# Patient Record
Sex: FEMALE | Race: WHITE | NOT HISPANIC OR LATINO | Employment: OTHER | URBAN - METROPOLITAN AREA
[De-identification: names, ages, dates, MRNs, and addresses within clinical notes are randomized per-mention and may not be internally consistent; named-entity substitution may affect disease eponyms.]

---

## 2017-01-04 ENCOUNTER — OFFICE VISIT (OUTPATIENT)
Dept: INTERNAL MEDICINE | Facility: CLINIC | Age: 82
End: 2017-01-04

## 2017-01-04 VITALS
SYSTOLIC BLOOD PRESSURE: 122 MMHG | DIASTOLIC BLOOD PRESSURE: 72 MMHG | HEART RATE: 74 BPM | OXYGEN SATURATION: 98 % | WEIGHT: 144 LBS | HEIGHT: 66 IN | BODY MASS INDEX: 23.14 KG/M2

## 2017-01-04 DIAGNOSIS — R91.8 PULMONARY INFILTRATES ON CXR: ICD-10-CM

## 2017-01-04 DIAGNOSIS — K59.00 CONSTIPATION, UNSPECIFIED CONSTIPATION TYPE: Primary | ICD-10-CM

## 2017-01-04 PROBLEM — H91.93 SEVERE HEARING LOSS OF BOTH EARS: Status: ACTIVE | Noted: 2017-01-04

## 2017-01-04 PROCEDURE — 99213 OFFICE O/P EST LOW 20 MIN: CPT | Performed by: INTERNAL MEDICINE

## 2017-01-04 NOTE — PROGRESS NOTES
"Subjective     Irina Brown is a 88 y.o. female who presents with   Chief Complaint   Patient presents with   • Pneumonia     Hospital FU        History of Present Illness     Patient is seen in f/u of ER visit for constipation.  She relates having problems for several months with \"blocked bowels\".  She has hard stool that need disimpacting on occasional.  She was seen in the ER for ABD pain.  Possible infiltrate is seen on acute ABD series but she is having no symptoms.  She did not take the antibiotic.  Her bowels are fine now after disimpaction.     Review of Systems   Constitutional: Negative for fever.   Respiratory: Negative.    Cardiovascular: Negative.    Gastrointestinal: Negative for abdominal pain.       The following portions of the patient's history were reviewed and updated as appropriate: allergies, current medications and problem list.    Patient Active Problem List    Diagnosis Date Noted   • Severe hearing loss of both ears 01/04/2017   • Delusions 10/27/2016     Note Last Updated: 11/22/2016     Concerns that others are trying to take her farm.  Concerns that others are trying to kill her.  She lives with son in safe environment.  Unwilling to stop driving.  Letter sent to Medical Driving Review Board 11/2016.     • History of uterine cancer 10/26/2016   • Type 2 diabetes mellitus with neurologic complication 06/07/2016   • Hyperlipidemia 03/08/2016     Note Last Updated: 3/8/2016     Description: Patient understands that Statin drugs are absolutely recommended with her diabetes and CAD.  Patient has declined to take drugs for cholesterol.     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  S/p full w/u.  Declined geriatric or neurology referral.        • History of colonic polyps 10/10/2013       Current Outpatient Prescriptions on File Prior to Visit   Medication Sig Dispense Refill   • aspirin 81 MG tablet Take 81 mg by mouth daily.     • bacitracin 500 UNIT/GM ointment Apply  topically 2 (two) times a day. 14 g 0   • carvedilol (COREG) 25 MG tablet TAKE ONE TABLET BY MOUTH TWICE A DAY 60 tablet 4   • econazole nitrate (SPECTAZOLE) 1 % cream      • metFORMIN (GLUCOPHAGE) 500 MG tablet TAKE TWO TABLETS BY MOUTH TWICE A  tablet 0   • repaglinide (PRANDIN) 0.5 MG tablet TAKE ONE TABLET BY MOUTH BEFORE MEALS (THREE TIMES A DAY) AS DIRECTED 270 tablet 2   • SYNTHROID 88 MCG tablet TAKE ONE TABLET BY MOUTH DAILY 30 tablet 4   • vitamin B-12 (CYANOCOBALAMIN) 100 MCG tablet Take 1 tablet by mouth daily.     • vitamin B-6 (PYRIDOXINE) 100 MG tablet Take 1 tablet by mouth daily.     • ZESTORETIC 20-25 MG per tablet TAKE ONE-HALF TABLET BY MOUTH DAILY 15 tablet 4   • [DISCONTINUED] azithromycin (ZITHROMAX) 250 MG tablet Take 1 tablet by mouth Daily. 1 tablet daily for 4 days. 4 tablet 0     No current facility-administered medications on file prior to visit.        Objective     Visit Vitals   • /72   • Pulse 74   • Ht 66\" (167.6 cm)   • Wt 144 lb (65.3 kg)   • LMP  (LMP Unknown)   • SpO2 98%   • BMI 23.24 kg/m2       Physical Exam   Constitutional: She is oriented to person, place, and time. She appears well-developed and well-nourished.   HENT:   Head: Normocephalic and atraumatic.   Pulmonary/Chest: Effort normal.   Neurological: She is alert and oriented to person, place, and time.   Psychiatric: She has a normal mood and affect. Her behavior is normal.       Assessment/Plan   Irina was seen today for pneumonia.    Diagnoses and all orders for this visit:    Constipation, unspecified constipation " type    Pulmonary infiltrates on CXR        Discussion  Patient presents in f/u of constipation.  Discussed prevention of problems through water, fiber such as metamucil and stool softener.  Miralax as needed for constipation.    Infiltrate on CXR.  Plan to check CXR in f/u.         Future Appointments  Date Time Provider Department Center   4/12/2017 9:15 AM MD RIRI Conway[facility] None   9/19/2017 11:40 AM MD RIRI FungK [facility] None

## 2017-01-04 NOTE — MR AVS SNAPSHOT
Irina Brown   1/4/2017 9:15 AM   Office Visit    Dept Phone:  [phone]   Encounter #:  [id number]    Provider:  Alexandra Baptiste MD   Department:  [facility] INTERNAL MEDICINE                Your Full Care Plan              Today's Medication Changes          These changes are accurate as of: 1/4/17  9:43 AM.  If you have any questions, ask your nurse or doctor.               Stop taking medication(s)listed here:     azithromycin 250 MG tablet   Commonly known as:  ZITHROMAX                      Your Updated Medication List          This list is accurate as of: 1/4/17  9:43 AM.  Always use your most recent med list.                aspirin 81 MG tablet       bacitracin 500 UNIT/GM ointment   Apply  topically 2 (two) times a day.       carvedilol 25 MG tablet   Commonly known as:  COREG   TAKE ONE TABLET BY MOUTH TWICE A DAY       econazole nitrate 1 % cream   Commonly known as:  SPECTAZOLE       metFORMIN 500 MG tablet   Commonly known as:  GLUCOPHAGE   TAKE TWO TABLETS BY MOUTH TWICE A DAY       repaglinide 0.5 MG tablet   Commonly known as:  PRANDIN   TAKE ONE TABLET BY MOUTH BEFORE MEALS (THREE TIMES A DAY) AS DIRECTED       SYNTHROID 88 MCG tablet   Generic drug:  levothyroxine   TAKE ONE TABLET BY MOUTH DAILY       vitamin B-12 100 MCG tablet   Commonly known as:  CYANOCOBALAMIN       vitamin B-6 100 MG tablet   Commonly known as:  PYRIDOXINE       ZESTORETIC 20-25 MG per tablet   Generic drug:  lisinopril-hydrochlorothiazide   TAKE ONE-HALF TABLET BY MOUTH DAILY               Instructions     None    Patient Instructions History      Upcoming Appointments     Visit Type Date Time Department    OFFICE VISIT 1/4/2017  9:15 AM [facility]    OFFICE VISIT 4/12/2017  9:15 AM [facility]    FOLLOW UP 9/19/2017 11:40 AM [facility]     Our records indicate that your [facility] account has been deactivated. If you would  "like to reactivate your account, please email [email or web address] or call [phone] to talk to our [facility] staff.             Other Info from Your Visit           Your Appointments     Apr 12, 2017  9:15 AM EDT   Office Visit with Alexandra Baptiste MD   [facility] INTERNAL MEDICINE (--)    [address]  [facility] [address] [phone]           Arrive 15 minutes prior to appointment.            Sep 19, 2017 11:40 AM EDT   Follow Up with Shira Feliz MD   [facility] CARDIOLOGY (--)    [address]  [facility] [address] [phone]           Arrive 15 minutes prior to appointment.              Allergies     Ciprofloxacin      Diazepam      Hydrocodone      Hydrocodone-acetaminophen      Lidocaine      Penicillins      Statins        Reason for Visit     Pneumonia Hospital FU       Vital Signs     Blood Pressure Pulse Height Weight Last Menstrual Period Oxygen Saturation    122/72 74 66\" (167.6 cm) 144 lb (65.3 kg) (LMP Unknown) 98%    Body Mass Index Smoking Status                23.24 kg/m2 Never Smoker            "

## 2017-02-09 RX ORDER — LISINOPRIL AND HYDROCHLOROTHIAZIDE 20; 25 MG/1; MG/1
TABLET ORAL
Qty: 15 TABLET | Refills: 3 | Status: SHIPPED | OUTPATIENT
Start: 2017-02-09

## 2017-11-08 ENCOUNTER — APPOINTMENT (OUTPATIENT)
Dept: CT IMAGING | Facility: HOSPITAL | Age: 82
End: 2017-11-08

## 2017-11-08 ENCOUNTER — HOSPITAL ENCOUNTER (EMERGENCY)
Facility: HOSPITAL | Age: 82
Discharge: HOME | End: 2017-11-08
Attending: EMERGENCY MEDICINE | Admitting: EMERGENCY MEDICINE

## 2017-11-08 VITALS
TEMPERATURE: 97.6 F | OXYGEN SATURATION: 97 % | HEIGHT: 68 IN | RESPIRATION RATE: 18 BRPM | DIASTOLIC BLOOD PRESSURE: 89 MMHG | BODY MASS INDEX: 24.25 KG/M2 | SYSTOLIC BLOOD PRESSURE: 180 MMHG | HEART RATE: 77 BPM | WEIGHT: 160 LBS
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
## 2017-11-08 DIAGNOSIS — S00.93XA CONTUSION OF HEAD, UNSPECIFIED PART OF HEAD, INITIAL ENCOUNTER: ICD-10-CM

## 2017-11-08 PROCEDURE — 70450 CT HEAD/BRAIN W/O DYE: CPT

## 2017-11-08 PROCEDURE — 99283 EMERGENCY DEPT VISIT LOW MDM: CPT

## 2017-11-08 NOTE — ED TRIAGE NOTES
Pt brought in by [facility] ems. Reports falling this morning. Denies pain. Hx of dementia.

## 2017-11-08 NOTE — ED PROVIDER NOTES
EMERGENCY DEPARTMENT ENCOUNTER    CHIEF COMPLAINT  Chief Complaint: fall  History given by: family (son), patient  History limited by: patient's chronic confusion and chronic decreased hearing  Room Number: 28/28  PMD: Alexandra Baptiste MD      HPI:  History predominantly obtained from son who lives with pt. Pt is a 89 y.o. female who has chronic confusion and chronic decreased hearing at baseline. Son reports that as he was helping pt walk down the hallway this morning, pt lost her balance and fell. Son notes that pt did not sustain blow to head or lose consciousness during the fall. Son called EMS because he had trouble getting pt up from off the floor. In ED, pt denies headache, neck pain, having any pain at all, or sustaining any injury. Per son, pt has been at her baseline mental status since the fall. Past Medical History of diabetes and frequent falls.     Duration: fall occurred this morning  Timing: N/A  Location: N/A  Radiation: N/A  Quality: N/A  Intensity/Severity: mild   Progression: N/A  Associated Symptoms: none  Aggravating Factors: N/A  Alleviating Factors: N/A  Previous Episodes: Per son, pt has hx of frequent falls.   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   • Urge incontinence of urine 03/08/2016   • Ventricular premature beats 03/08/2016   • Peripheral vascular disease 03/08/2016   • Left bundle branch block (LBBB) 03/08/2016   • Hypothyroidism 03/08/2016   • Hypertension 03/08/2016   • Diabetic peripheral neuropathy 03/08/2016   • Chronic coronary artery disease 03/08/2016   • Auditory hallucination 03/08/2016   • Type 2 diabetes mellitus with neurologic complication 06/07/2016   • History of colonic polyps 10/10/2013   • History of uterine cancer 10/26/2016   • Delusions 10/27/2016   • Severe hearing loss of both ears 01/04/2017     Resolved Ambulatory  Problems     Diagnosis Date Noted   • Palpitations 03/08/2016   • Hallucinations 10/27/2016     Past Medical History:   Diagnosis Date   • Atherosclerotic coronary vascular disease    • Breast cancer    • Breast screening    • Chest pain    • Diabetic peripheral neuropathy    • Diverticulosis    • Endometrial cancer    • Esophageal reflux    • H/O Bell's palsy    • H/O electrocardiogram    • Health care maintenance    • Hyperlipidemia    • Hypertension    • Hypothyroidism    • Inconclusive mammogram    • LBBB (left bundle branch block)    • Numbness in both legs    • Olecranon bursitis    • Osteoarthritis, hip, bilateral    • Palpitations    • Pilonidal cyst    • Premature ventricular contractions    • PVD (peripheral vascular disease)    • Right hip pain    • Thyroid adenoma    • Type 2 diabetes mellitus    • Urge incontinence    • Uterine cancer    • UTI (urinary tract infection)    • Venous insufficiency of lower extremity    • Warthin's tumor        PAST SURGICAL HISTORY  Past Surgical History:   Procedure Laterality Date   • BACK SURGERY      cyst removal   • BREAST BIOPSY     • BREAST LUMPECTOMY Left     1956 & 1958   • CORONARY STENT PLACEMENT      previous   • HIP SURGERY      cyst removal   • HYSTERECTOMY      endometrial CA   • THYROIDECTOMY      near-total       FAMILY HISTORY  Family History   Problem Relation Age of Onset   • Breast cancer Mother    • Diabetes Mother    • Hypertension Mother    • Emphysema Father    • Hypertension Father    • Heart attack Father        SOCIAL HISTORY  Social History     Social History   • Marital status:      Spouse name: N/A   • Number of children: N/A   • Years of education: N/A     Occupational History   • Not on file.     Social History Main Topics   • Smoking status: Never Smoker   • Smokeless tobacco: Not on file   • Alcohol use No   • Drug use: No   • Sexual activity: Not on file     Other Topics Concern   • Not on file     Social History Narrative          ALLERGIES  Ciprofloxacin; Diazepam; Hydrocodone; Hydrocodone-acetaminophen; Lidocaine; Penicillins; and Statins    REVIEW OF SYSTEMS  Review of Systems   Unable to perform ROS: Other (pt's chronic confusion and chronic decreased hearing)       PHYSICAL EXAM  ED Triage Vitals   Temp Heart Rate Resp BP SpO2   11/08/17 1056 11/08/17 1056 11/08/17 1056 11/08/17 1056 11/08/17 1056   97.3 °F (36.3 °C) 69 18 189/108 96 % WNL     Physical Exam   Constitutional: She is well-developed, well-nourished, and in no distress.   Profound hard of hearing (exam limited), no obvious signs of trauma   HENT:   Head: Normocephalic and atraumatic.   Mouth/Throat: Mucous membranes are normal.   Eyes: EOM are normal. Pupils are equal, round, and reactive to light. No scleral icterus.   Neck: Normal range of motion.   No obvious c-spine tenderness   Cardiovascular: Normal rate, regular rhythm and normal heart sounds.    Pulmonary/Chest: Breath sounds normal. No respiratory distress. She exhibits no tenderness (no obvious chest tenderness).   Abdominal: Soft. There is no tenderness (no obvious abd tenderness).   Musculoskeletal: Normal range of motion.   No obvious t-spine or l-spine tenderness, FROM of all extremities   Neurological: She is alert. She has normal motor skills and normal sensation.   Appears confused, nonfocal neuro exam   Skin: Skin is warm and dry.   Nursing note and vitals reviewed.      RADIOLOGY         CT Head Without Contrast (Preliminary result) Result time: 11/08/17 13:06:59     Preliminary result by Interface, Oceen Crow In (11/08/17 13:06:59)     Impression:     No evidence of fracture or of intracranial hemorrhage. There  is age-appropriate atrophy, mild small vessel ischemic disease and  moderate vascular calcification.     Radiation dose reduction techniques were utilized, including automated  exposure control and exposure modulation based on body size.           Narrative:     CT HEAD WITHOUT  CONTRAST     HISTORY: Fall.     A noncontrasted CT examination of the brain was performed.     The brain and ventricles are symmetrical. There is no evidence of  hemorrhage, hydrocephalus or of abnormal extra-axial fluid. No focal  area of decreased attenuation to suggest acute infarction is identified.  Bone windows showed no evidence of a calvarial fracture.              I ordered the above noted radiological studies and reviewed the images on the PACS system.      PROGRESS AND CONSULTS  11:28 AM- Reviewed pt's history and workup with Dr. Harp.  At bedside evaluation, they agree with the plan of care.  2:03 PM- Rechecked pt. She is resting comfortably and is in no acute distress. Reviewed implications of results (including CT head findings (no acute process)), diagnosis, meds, responsibility to follow up, warning signs and symptoms of possible worsening, potential complications and reasons to return to ER with patient's family. Discussed all results and noted any abnormalities with patient's family.  Discussed with family about absolute need to have pt recheck abnormalities and condition with PMD.   Discussed plan for discharge, as there is no emergent indication for admission.  Pt's family is agreeable and understand need for follow up and repeat testing. Family is aware that discharge does not mean that nothing is wrong but it indicates no emergency is present.  Pt is discharged with instructions to follow up with primary care doctor to have their blood pressure rechecked.       DIAGNOSIS  Final diagnoses:   Fall, initial encounter   Contusion of head, unspecified part of head, initial encounter       FOLLOW UP   Alexandra Baptiste MD  [address] [id number] [phone]    Call in 1 day          COURSE & MEDICAL DECISION MAKING  Pertinent Imaging studies that were ordered and reviewed are noted above.  Results were reviewed/discussed with the patient's family and they were also made  "aware of online assess.   Pt's family also made aware that some labs, such as cultures, will not be resulted during ER visit and follow up with PMD is necessary.     MEDICATIONS GIVEN IN ER  Medications - No data to display    /89  Pulse 77  Temp 97.6 °F (36.4 °C)  Resp 18  Ht 68\" (172.7 cm)  Wt 160 lb (72.6 kg)  LMP  (LMP Unknown)  SpO2 97%  BMI 24.33 kg/m2      I personally reviewed the past medical history, past surgical history, social history, family history, current medications and allergies as they appear in this chart.  The scribe's note accurately reflects the work and decisions made by me.     Documentation assistance provided by jorge Heard for DENNIS Olsen on 11/8/2017 at 2:07 PM. Information recorded by the scribe was done at my direction and has been verified and validated by me.       Martinez Heard  11/08/17 1418       ALLA Morocho  11/08/17 1500    "

## 2017-11-08 NOTE — DISCHARGE INSTRUCTIONS
Continue current home medications  Use walker when ambulating  Follow up with pmd as needed  Return to er for fever,chills, chest pain, shortness of air, dizziness, vomiting, headache, weakness or any new or worsening symptoms

## 2017-11-08 NOTE — ED PROVIDER NOTES
Pt is a 89 y.o. female who presents s/p fall this morning. Pt's son denies head injury or LOC from the fall. Pt's son called EMS because he was not able to get the Pt off the floor. Hx limited by Pt dementia and hearing loss. Pt's son states Pt is at baseline mental status currently.     PE:  Resting comfortably, no distress  Vitals stable  Head atraumatic and neck non tender  Extremities and back non tender    Plan to check CT head.     I supervised care provided by the midlevel provider Keren GOLD.    We have discussed this patient's history, physical exam, and treatment plan.   I have reviewed the note and personally saw and examined the patient and agree with the plan of care.    Documentation assistance provided by jorge Euceda for Dr. Harp.  Information recorded by the scribe was done at my direction and has been verified and validated by me.       Karol Euceda  11/08/17 1248       Harsh Harp MD  11/08/17 [id number]

## 2017-11-10 ENCOUNTER — PATIENT OUTREACH (OUTPATIENT)
Dept: CASE MANAGEMENT | Facility: OTHER | Age: 82
End: 2017-11-10

## 2017-11-10 NOTE — OUTREACH NOTE
"Spoke with son and patient has not had any physician follow-up for at \"least a year.\"  Reports advanced dementia and has stopped taking medications.  Advised to schedule routine physician appointments and reports \"she will refuse.\"  Discussed physician practices that make house calls and declined contact resources today.  Son provides carry out food and shopping for meals.  Patient no longer takes prescribed medications and would refuse vaccines.  Discussed at length behavioral issues related to dementia including her hallucinations and best practices to manage symptoms.  States independent with personal care needs and denies issues of wandering.  Discussed safety issues and preventive measures related to dementia and falls.  Care management assessment completed today.  "

## 2017-12-21 ENCOUNTER — APPOINTMENT (OUTPATIENT)
Dept: GENERAL RADIOLOGY | Facility: HOSPITAL | Age: 82
End: 2017-12-21

## 2017-12-21 ENCOUNTER — HOSPITAL ENCOUNTER (EMERGENCY)
Facility: HOSPITAL | Age: 82
Discharge: HOME | End: 2017-12-21
Admitting: EMERGENCY MEDICINE

## 2017-12-21 VITALS
RESPIRATION RATE: 18 BRPM | TEMPERATURE: 98.2 F | HEART RATE: 59 BPM | BODY MASS INDEX: 27.32 KG/M2 | SYSTOLIC BLOOD PRESSURE: 192 MMHG | HEIGHT: 66 IN | OXYGEN SATURATION: 98 % | WEIGHT: 170 LBS | DIASTOLIC BLOOD PRESSURE: 83 MMHG

## 2017-12-21 DIAGNOSIS — M54.6 ACUTE BILATERAL THORACIC BACK PAIN: ICD-10-CM

## 2017-12-21 DIAGNOSIS — S39.012A LUMBAR STRAIN, INITIAL ENCOUNTER: ICD-10-CM
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
## 2017-12-21 PROCEDURE — 99284 EMERGENCY DEPT VISIT MOD MDM: CPT

## 2017-12-21 PROCEDURE — 72072 X-RAY EXAM THORAC SPINE 3VWS: CPT

## 2017-12-21 PROCEDURE — 72110 X-RAY EXAM L-2 SPINE 4/>VWS: CPT

## 2017-12-21 PROCEDURE — 71101 X-RAY EXAM UNILAT RIBS/CHEST: CPT

## 2017-12-21 NOTE — DISCHARGE INSTRUCTIONS
You can take Tylenol as needed for pain    Return Precautions    Although you are being discharged from the ED today, I encourage you to return for worsening symptoms.  Things can, and do, change such that treatment at home with medication may not be adequate.      Specifically, return for any of the following:    Chest pain, shortness of breath, pain or nausea and vomiting not controlled by medications provided.    Please make a follow up with your Primary Care Provider for a blood pressure recheck.

## 2017-12-21 NOTE — ED PROVIDER NOTES
EMERGENCY DEPARTMENT ENCOUNTER    Room Number:  14/14  Date seen:  12/21/2017  Time seen: 2:02 PM  PCP: Alexandra Baptiste MD   History provided by- patient, past medical records  History limited by- dementia, profound hearing loss    HPI:  Chief complaint: pain post fall  Context:Irina Brown is a 89 y.o. female who presents to the ED s/p fall that occurred today. She states that while she was in the [email or web address] parking lot, she lost her balance and fell, landing on her right side. Since then, she has had right mid back pain/right rib pain. She denies blow to head, loss of consciousness, headache, neck pain, abd pain, and trouble breathing. Pt unable to provide any further history secondary to dementia and profound hearing loss. There are no other complaints at this time.     Timing: unknown  Duration: fall occurred today  Location: right mid back/right ribs  Radiation: unknown  Quality: pain  Intensity/Severity: unknown  Associated Symptoms: right mid back pain/right rib pain  Aggravating Factors: unknown  Alleviating Factors: unknown  Previous Episodes: Per past medical records, pt has hx of frequent falls.   Treatment before arrival: unknown    MEDICAL RECORD REVIEW  Pt was seen in ED on 11/8/17 s/p fall. At the time, CT head showed no acute process. Pt has hx of frequent falls.       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   • Urge incontinence of urine 03/08/2016   • Ventricular premature beats 03/08/2016   • Peripheral vascular disease 03/08/2016   • Left bundle branch block (LBBB) 03/08/2016   • Hypothyroidism 03/08/2016   • Hypertension 03/08/2016   • Diabetic peripheral neuropathy 03/08/2016   • Chronic coronary artery disease 03/08/2016   • Auditory hallucination 03/08/2016   • Type 2 diabetes mellitus  with neurologic complication 06/07/2016   • History of colonic polyps 10/10/2013   • History of uterine cancer 10/26/2016   • Delusions 10/27/2016   • Severe hearing loss of both ears 01/04/2017     Resolved Ambulatory Problems     Diagnosis Date Noted   • Palpitations 03/08/2016   • Hallucinations 10/27/2016     Past Medical History:   Diagnosis Date   • Atherosclerotic coronary vascular disease    • Breast cancer    • Breast screening    • Chest pain    • Dementia    • Diabetic peripheral neuropathy    • Diverticulosis    • Endometrial cancer    • Esophageal reflux    • H/O Bell's palsy    • H/O electrocardiogram    • Health care maintenance    • Hyperlipidemia    • Hypertension    • Hypothyroidism    • Inconclusive mammogram    • LBBB (left bundle branch block)    • Numbness in both legs    • Olecranon bursitis    • Osteoarthritis, hip, bilateral    • Palpitations    • Pilonidal cyst    • Premature ventricular contractions    • PVD (peripheral vascular disease)    • Right hip pain    • Thyroid adenoma    • Type 2 diabetes mellitus    • Urge incontinence    • Uterine cancer    • UTI (urinary tract infection)    • Venous insufficiency of lower extremity    • Warthin's tumor        PAST SURGICAL HISTORY  Past Surgical History:   Procedure Laterality Date   • BACK SURGERY      cyst removal   • BREAST BIOPSY     • BREAST LUMPECTOMY Left     1956 & 1958   • CORONARY STENT PLACEMENT      previous   • HIP SURGERY      cyst removal   • HYSTERECTOMY      endometrial CA   • THYROIDECTOMY      near-total       FAMILY HISTORY  Family History   Problem Relation Age of Onset   • Breast cancer Mother    • Diabetes Mother    • Hypertension Mother    • Emphysema Father    • Hypertension Father    • Heart attack Father        SOCIAL HISTORY  Social History     Social History   • Marital status:      Spouse name: N/A   • Number of children: N/A   • Years of education: N/A     Occupational History   • Not on file.     Social  History Main Topics   • Smoking status: Never Smoker   • Smokeless tobacco: Not on file   • Alcohol use No   • Drug use: No   • Sexual activity: Not on file     Other Topics Concern   • Not on file     Social History Narrative   • No narrative on file       REVIEW OF SYSTEMS  Review of Systems   Unable to perform ROS: Dementia (profound hearing loss)   Respiratory: Negative for shortness of breath.    Cardiovascular: Positive for chest pain (right mid back pain/right rib pain  ).   Gastrointestinal: Negative for abdominal pain.   Musculoskeletal: Positive for back pain (right mid back pain/right rib pain  ). Negative for neck pain.   Neurological: Negative for headaches.       PHYSICAL EXAM  ED Triage Vitals   Temp Heart Rate Resp BP SpO2   12/21/17 1326 12/21/17 1322 12/21/17 1322 12/21/17 1322 12/21/17 1322   98.2 °F (36.8 °C) 88 18 180/90 99 % WNL      Temp src Heart Rate Source Patient Position BP Location FiO2 (%)   12/21/17 1326 12/21/17 1322 -- -- --   Tympanic Monitor        Physical Exam   Constitutional: No distress.   Exam limited by profound hearing loss   HENT:   Head: Normocephalic and atraumatic.   Profoundly hard of hearing   Eyes: EOM are normal. Pupils are equal, round, and reactive to light.   Neck: Normal range of motion. Neck supple.   No c-spine tenderness   Cardiovascular: Normal rate, regular rhythm and normal heart sounds.    Pulmonary/Chest: Effort normal and breath sounds normal. No respiratory distress. She exhibits no tenderness.   Musculoskeletal:   Mild t-spine tenderness, no l-spine tenderness, no palpable rib tenderness on the right   Neurological: She is alert.   Motor function intact to all extremities, pleasantly confused but answers questions appropriately   Skin: Skin is warm and dry.   Nursing note and vitals reviewed.        RADIOLOGY         XR Ribs Right With PA Chest (Preliminary result) Result time: 12/21/17 16:31:49     Preliminary result by Interface, Rad Results  Edgardo In (12/21/17 16:31:49)     Impression:     No acute abnormality is identified on x-rays of the chest,  right thoracic cage, thoracic spine, or lumbar spine.        Narrative:     THORACIC SPINE AND LUMBAR SPINE X-RAYS, PA CHEST X-RAY AND RIGHT RIB  SERIES     HISTORY: Fall, pain in all of the above locations.     A PA view of the chest and 4 views of the right thoracic cage is  provided. The cardiomediastinal silhouette is normal and the lungs are  clear. There is no pneumothorax. No rib fracture or rib lesion is  identified.     Three images of the thoracic spine and 4 images of the lumbar spine are  provided. There is degenerative disc change throughout the thoracic  spine. Vertebral body height appears intact.     In the lumbar spine, there is advanced multilevel degenerative disc  change with vacuum phenomenon at every level except the L1-2 level.  Vertebral body height and alignment are normal. No fracture is  identified.          I ordered the above noted radiological studies and reviewed the images on the PACS system.          MEDICATIONS GIVEN IN ER  Medications - No data to display        PROCEDURES  Procedures    COURSE & MEDICAL DECISION MAKING  Pertinent Imaging studies that were ordered and reviewed are noted above.  Results were reviewed/discussed with the patient and they were also made aware of online assess.  Pt also made aware that some labs, such as cultures, will not be resulted during ER visit and follow up with PMD is necessary.       PROGRESS AND CONSULTS    Progress Notes:    ED Course       1447- Pt ambulated to and from restroom with steady gait and with staff assistance x2.     1603- Reviewed pt's history and workup with Dr. Zhang.  After a bedside evaluation; Dr Zhang agrees with the plan of care.     1652- Rechecked pt. She is resting comfortably and is in no acute distress. Discussed with pt about all pertinent results including right ribs and chest xray, t-spine xray, and  "l-spine xray findings that show no acute process. Informed pt of diagnosis and plan for discharge. Advised pt to take tylenol for pain. Instructed to f/u with PMD for recheck. RTER warnings given. Addressed all questions.    The patient's history, physical exam, and lab findings were discussed with the physician, who also performed a face to face history and physical exam.  I discussed all results and noted any abnormalities with patient.  Discussed absoute need to recheck abnormalities with their family physician.  I answered any of the patient's questions.  Discussed plan for discharge, as there is no emergent indication for admission.  Pt is agreeable and understands need for follow up and repeat testing.  Pt is aware that discharge does not mean that nothing is wrong but it indicates no emergency is present and they must continue care with their family physician.  Pt is discharged with instructions to follow up with primary care doctor to have their blood pressure rechecked.     Recheck may have been hampered due to pt's profound hearing loss.      Disposition vitals:  BP (!) 189/85  Pulse 58  Temp 98.2 °F (36.8 °C) (Tympanic)   Resp 18  Ht 167.6 cm (66\")  Wt 77.1 kg (170 lb)  LMP  (LMP Unknown)  SpO2 96%  BMI 27.44 kg/m2      DIAGNOSIS  Final diagnoses:   Fall (on)(from) sidewalk curb, initial encounter   Lumbar strain, initial encounter   Acute bilateral thoracic back pain       FOLLOW UP   Alexandra Baptiste MD  [address] [address]  [phone]    Schedule an appointment as soon as possible for a visit  As needed      Documentation assistance provided by jorge Heard for [facility].  Information recorded by the scribcindy was done at my direction and has been verified and validated by me.  Electronically signed by Martinez Heard on 12/21/2017 at time 4:55 PM       Martinez Heard  12/21/17 1711       ALLA Bui  12/22/17 0015    "

## 2017-12-21 NOTE — ED PROVIDER NOTES
"Pt presents to the ED department due to mid back and rib pain sustain when she was stuck with a \"machine\"/ scooter in the [employer] parking lot today at about noon.  Pt denies vomiting, LOC or abdominal pain.  On exam there is no C-spine tenderness.  Heart RRR,  Lungs are CTAB.  There is mild right posterior rib pain, mid and lower thoracic spinous tenderness.  Abdomen has normal active bowel sounds, soft, non-tender, non distended.  1+ edema with both legs, but FROM bilaterally.      I supervised care provided by the midlevel provider.    We have discussed this patient's history, physical exam, and treatment plan.   I have reviewed the note and personally saw and examined the patient and agree with the plan of care.    Documentation assistance provided by jorge Shetty for Dr. Zhang.  Information recorded by the scribe was done at my direction and has been verified and validated by me.       Christa Shetty  12/21/17 1648       Agus Zhang MD  12/22/17 0003    "

## 2017-12-21 NOTE — ED TRIAGE NOTES
Pt was walking out of [employer], lost her balance and fell backwards, landing on her right side, c/o right rib pain. Denies hitting head. Pt's son states pt no longer takes her medications since losing her 's license, however, pt states she still takes her heart meds

## 2018-01-04 ENCOUNTER — PATIENT OUTREACH (OUTPATIENT)
Dept: CASE MANAGEMENT | Facility: OTHER | Age: 83
End: 2018-01-04

## 2018-01-05 ENCOUNTER — PATIENT OUTREACH (OUTPATIENT)
Dept: CASE MANAGEMENT | Facility: OTHER | Age: 83
End: 2018-01-05

## 2018-01-05 NOTE — OUTREACH NOTE
Contacted by son to discuss options for nursing home placement, advance directives, and behavioral annie evaluation.  Discussed facilities in his area and the importance of placement in a facility with a Memory Care Unit and he plans to follow-up with contact to three facilities in the [place], [place] area. Discussed disease progression with dementia and need for medical attention with behavioral disturbance and safety precautions related to recent falls.  He is aware patient should not be left alone.

## 2018-01-08 ENCOUNTER — PATIENT OUTREACH (OUTPATIENT)
Dept: CASE MANAGEMENT | Facility: OTHER | Age: 83
End: 2018-01-08

## 2018-01-08 NOTE — OUTREACH NOTE
Review of Urgent Care visit from 1/5/18.  States patient has never been to an [facility] but has a representative coming today to discuss placement options.  Continues with hallucinations, son providing 24 hour care with ADL assistance, and acknowledges behavioral disturbance related to dementia.  Contact information provided to contact [place] as needed to assist with resources.

## 2018-01-18 ENCOUNTER — EPISODE CHANGES (OUTPATIENT)
Dept: CASE MANAGEMENT | Facility: OTHER | Age: 83
End: 2018-01-18

## 2018-01-24 ENCOUNTER — PATIENT OUTREACH (OUTPATIENT)
Dept: CASE MANAGEMENT | Facility: OTHER | Age: 83
End: 2018-01-24

## 2018-02-26 ENCOUNTER — PATIENT OUTREACH (OUTPATIENT)
Dept: CASE MANAGEMENT | Facility: OTHER | Age: 83
End: 2018-02-26

## 2018-05-09 ENCOUNTER — HOSPITAL ENCOUNTER (EMERGENCY)
Facility: HOSPITAL | Age: 83
Discharge: HOME | End: 2018-05-09
Attending: EMERGENCY MEDICINE | Admitting: EMERGENCY MEDICINE

## 2018-05-09 VITALS
WEIGHT: 175 LBS | HEIGHT: 66 IN | HEART RATE: 88 BPM | SYSTOLIC BLOOD PRESSURE: 150 MMHG | RESPIRATION RATE: 20 BRPM | BODY MASS INDEX: 28.12 KG/M2 | TEMPERATURE: 98.5 F | DIASTOLIC BLOOD PRESSURE: 88 MMHG | OXYGEN SATURATION: 98 %

## 2018-05-09 DIAGNOSIS — K02.9 DENTAL CARIES: ICD-10-CM

## 2018-05-09 DIAGNOSIS — K04.7 DENTAL ABSCESS: Primary | ICD-10-CM

## 2018-05-09 PROCEDURE — 99283 EMERGENCY DEPT VISIT LOW MDM: CPT

## 2018-05-09 RX ORDER — CLINDAMYCIN HYDROCHLORIDE 300 MG/1
300 CAPSULE ORAL 4 TIMES DAILY
Qty: 28 CAPSULE | Refills: 0 | Status: SHIPPED | OUTPATIENT
Start: 2018-05-09

## 2018-05-09 NOTE — ED TRIAGE NOTES
Pt has area on Left lower jaw that is swollen, tender.  States showed up last night. Pt is very hard of hearing, no family in triage with patient.

## 2018-05-09 NOTE — ED PROVIDER NOTES
[facility] EMERGENCY DEPARTMENT ENCOUNTER    CHIEF COMPLAINT  Chief Complaint: facial swelling  History given by: patient  History limited by: nothing  [facility] Room Number: 47/47  PMD: Alexandra Baptiste MD      HPI:  Pt is a 89 y.o. female who presents complaining of left sided facial swelling that she first noticed last night. Pt states that the swelling has improved since that time. Pt denies additional complaint.    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   • Urge incontinence of urine 03/08/2016   • Ventricular premature beats 03/08/2016   • Peripheral vascular disease 03/08/2016   • Left bundle branch block (LBBB) 03/08/2016   • Hypothyroidism 03/08/2016   • Hypertension 03/08/2016   • Diabetic peripheral neuropathy 03/08/2016   • Chronic coronary artery disease 03/08/2016   • Auditory hallucination 03/08/2016   • Type 2 diabetes mellitus with neurologic complication 06/07/2016   • History of colonic polyps 10/10/2013   • History of uterine cancer 10/26/2016   • Delusions 10/27/2016   • Severe hearing loss of both ears 01/04/2017     Resolved Ambulatory Problems     Diagnosis Date Noted   • Palpitations 03/08/2016   • Hallucinations 10/27/2016     Past Medical History:   Diagnosis Date   • Atherosclerotic coronary vascular disease    • Breast cancer    • Breast screening    • Chest pain    • Dementia    • Diabetic peripheral neuropathy    • Diverticulosis    • Endometrial cancer    • Esophageal reflux    • H/O Bell's palsy    • H/O electrocardiogram    • Health care maintenance    • Hyperlipidemia    • Hypertension    • Hypothyroidism    • Inconclusive mammogram    • LBBB (left bundle branch block)    • Numbness in both legs    • Olecranon bursitis    • Osteoarthritis, hip, bilateral    •  Palpitations    • Pilonidal cyst    • Premature ventricular contractions    • PVD (peripheral vascular disease)    • Right hip pain    • Thyroid adenoma    • Type 2 diabetes mellitus    • Urge incontinence    • Uterine cancer    • UTI (urinary tract infection)    • Venous insufficiency of lower extremity    • Warthin's tumor        PAST SURGICAL HISTORY  Past Surgical History:   Procedure Laterality Date   • BACK SURGERY      cyst removal   • BREAST BIOPSY     • BREAST LUMPECTOMY Left     1956 & 1958   • CORONARY STENT PLACEMENT      previous   • HIP SURGERY      cyst removal   • HYSTERECTOMY      endometrial CA   • THYROIDECTOMY      near-total       FAMILY HISTORY  Family History   Problem Relation Age of Onset   • Breast cancer Mother    • Diabetes Mother    • Hypertension Mother    • Emphysema Father    • Hypertension Father    • Heart attack Father        SOCIAL HISTORY  Social History     Social History   • Marital status:      Spouse name: N/A   • Number of children: N/A   • Years of education: N/A     Occupational History   • Not on file.     Social History Main Topics   • Smoking status: Never Smoker   • Smokeless tobacco: Not on file   • Alcohol use No   • Drug use: No   • Sexual activity: Not on file     Other Topics Concern   • Not on file     Social History Narrative   • No narrative on file       ALLERGIES  Ciprofloxacin; Diazepam; Hydrocodone; Hydrocodone-acetaminophen; Lidocaine; Penicillins; and Statins    REVIEW OF SYSTEMS  Review of Systems   Constitutional: Negative for fever.   HENT: Positive for facial swelling (left sided). Negative for sore throat.    Eyes: Negative.    Respiratory: Negative for cough and shortness of breath.    Cardiovascular: Negative for chest pain.   Gastrointestinal: Negative for abdominal pain, diarrhea and vomiting.   Genitourinary: Negative for dysuria.   Musculoskeletal: Negative for neck pain.   Skin: Negative for rash.   Allergic/Immunologic: Negative.     Neurological: Negative for weakness, numbness and headaches.   Hematological: Negative.    Psychiatric/Behavioral: Negative.    All other systems reviewed and are negative.      PHYSICAL EXAM  ED Triage Vitals   Temp Heart Rate Resp BP SpO2   05/09/18 1425 05/09/18 1314 05/09/18 1314 05/09/18 1314 05/09/18 1314   98.5 °F (36.9 °C) 88 20 150/88 98 %      Temp src Heart Rate Source Patient Position BP Location FiO2 (%)   05/09/18 1425 -- -- -- --   Oral           Physical Exam   Constitutional: No distress.   Pt appears elderly and smells of urine   HENT:   Head: Normocephalic and atraumatic.   Right Ear: Decreased hearing (chronic) is noted.   Left Ear: Decreased hearing (chronic) is noted.   Mouth/Throat: Dental abscesses and dental caries present.   2nd premolar and two molars on left lower jaw have extensive dental caries with gingival erythema and abscess   Eyes: EOM are normal. Pupils are equal, round, and reactive to light.   Neck: Normal range of motion. Neck supple.   Cardiovascular: Normal rate, regular rhythm and normal heart sounds.    No murmur heard.  Pulmonary/Chest: Effort normal and breath sounds normal. No respiratory distress.   Abdominal: Soft. There is no tenderness. There is no rebound and no guarding.   Musculoskeletal: Normal range of motion. She exhibits edema (mild, bilateral ankles).   Pt has moderate to severe onychomycosis on bilateral feet   Neurological: She is alert.   Skin: Skin is warm and dry. No rash noted.   Psychiatric: Mood and affect normal.   Nursing note and vitals reviewed.    PROCEDURES  Procedures      PROGRESS AND CONSULTS  ED Course     1507- Notified pt that she has a dental abscess and will need to follow up with a dentist to have her teeth pulled. I notified pt that she will need to follow up with a podiatrist for her feet. Discussed the plan to discharge the pt home with a prescription for abx. Pt understands and agrees with the plan, all questions  answered.    MEDICAL DECISION MAKING  Results were reviewed/discussed with the patient and they were also made aware of online access. Pt also made aware that follow up with PMD is necessary.     MDM  Number of Diagnoses or Management Options  Dental abscess:   Dental caries:      Amount and/or Complexity of Data Reviewed  Decide to obtain previous medical records or to obtain history from someone other than the patient: yes  Review and summarize past medical records: yes (Pt was last seen in the ED in December 2017 for lumbar strain s/p fall. Pt was seen at  on 1/5/18 for AMS.)    Patient Progress  Patient progress: stable         DIAGNOSIS  Final diagnoses:   Dental abscess   Dental caries       DISPOSITION  DISCHARGE    Patient discharged in stable condition.    Reviewed implications of results, diagnosis, meds, responsibility to follow up, warning signs and symptoms of possible worsening, potential complications and reasons to return to ER, including fever, worsening pain or any concerns.    Patient/Family voiced understanding of above instructions.    Discussed plan for discharge, as there is no emergent indication for admission. Patient referred to primary care provider for BP management due to today's BP. Pt/family is agreeable and understands need for follow up and repeat testing.  Pt is aware that discharge does not mean that nothing is wrong but it indicates no emergency is present that requires admission and they must continue care with follow-up as given below or physician of their choice.     FOLLOW-UP  Sawyer Mcleod DPM  [address]  [facility] [address] [phone]    Schedule an appointment as soon as possible for a visit       Hiren Keene DDS  [address] [address] [phone]    Schedule an appointment as soon as possible for a visit            Medication List      New Prescriptions    clindamycin 300 MG capsule  Commonly known as:   CLEOCIN  Take 1 capsule by mouth 4 (Four) Times a Day.              Latest Documented Vital Signs:  As of 3:38 PM  BP- 150/88 HR- 88 Temp- 98.5 °F (36.9 °C) (Oral) O2 sat- 98%    --  Documentation assistance provided by jorge Trent for Dr. Ca.  Information recorded by the scribe was done at my direction and has been verified and validated by me.     Tammi Trent  05/09/18 1539       Luis Ca MD  05/09/18 4374

## 2018-07-05 ENCOUNTER — HOSPITAL ENCOUNTER (EMERGENCY)
Facility: HOSPITAL | Age: 83
Discharge: HOME | End: 2018-07-05
Attending: EMERGENCY MEDICINE | Admitting: EMERGENCY MEDICINE

## 2018-07-05 ENCOUNTER — APPOINTMENT (OUTPATIENT)
Dept: GENERAL RADIOLOGY | Facility: HOSPITAL | Age: 83
End: 2018-07-05

## 2018-07-05 VITALS
BODY MASS INDEX: 24.81 KG/M2 | TEMPERATURE: 99.1 F | HEART RATE: 80 BPM | SYSTOLIC BLOOD PRESSURE: 197 MMHG | OXYGEN SATURATION: 94 % | RESPIRATION RATE: 18 BRPM | WEIGHT: 167.5 LBS | DIASTOLIC BLOOD PRESSURE: 85 MMHG | HEIGHT: 69 IN

## 2018-07-05 DIAGNOSIS — R19.7 DIARRHEA OF PRESUMED INFECTIOUS ORIGIN: ICD-10-CM

## 2018-07-05 DIAGNOSIS — S20.211A CONTUSION OF RIGHT CHEST WALL, INITIAL ENCOUNTER: ICD-10-CM
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
## 2018-07-05 LAB
ALBUMIN SERPL-MCNC: 4.1 G/DL (ref 3.5–5.2)
ALBUMIN/GLOB SERPL: 1.2 G/DL
ALP SERPL-CCNC: 108 U/L (ref 39–117)
ALT SERPL W P-5'-P-CCNC: 9 U/L (ref 1–33)
ANION GAP SERPL CALCULATED.3IONS-SCNC: 10.5 MMOL/L
AST SERPL-CCNC: 11 U/L (ref 1–32)
BASOPHILS # BLD AUTO: 0.03 10*3/MM3 (ref 0–0.2)
BASOPHILS NFR BLD AUTO: 0.5 % (ref 0–1.5)
BILIRUB SERPL-MCNC: 0.4 MG/DL (ref 0.1–1.2)
BILIRUB UR QL STRIP: NEGATIVE
BUN BLD-MCNC: 23 MG/DL (ref 8–23)
BUN/CREAT SERPL: 22.1 (ref 7–25)
CALCIUM SPEC-SCNC: 9.1 MG/DL (ref 8.6–10.5)
CHLORIDE SERPL-SCNC: 98 MMOL/L (ref 98–107)
CLARITY UR: CLEAR
CO2 SERPL-SCNC: 27.5 MMOL/L (ref 22–29)
COLOR UR: YELLOW
CREAT BLD-MCNC: 1.04 MG/DL (ref 0.57–1)
DEPRECATED RDW RBC AUTO: 45 FL (ref 37–54)
EOSINOPHIL # BLD AUTO: 0.02 10*3/MM3 (ref 0–0.7)
EOSINOPHIL NFR BLD AUTO: 0.3 % (ref 0.3–6.2)
ERYTHROCYTE [DISTWIDTH] IN BLOOD BY AUTOMATED COUNT: 13.2 % (ref 11.7–13)
GFR SERPL CREATININE-BSD FRML MDRD: 50 ML/MIN/1.73
GLOBULIN UR ELPH-MCNC: 3.3 GM/DL
GLUCOSE BLD-MCNC: 188 MG/DL (ref 65–99)
GLUCOSE UR STRIP-MCNC: ABNORMAL MG/DL
HCT VFR BLD AUTO: 39.2 % (ref 35.6–45.5)
HGB BLD-MCNC: 13.1 G/DL (ref 11.9–15.5)
HGB UR QL STRIP.AUTO: NEGATIVE
HOLD SPECIMEN: NORMAL
HOLD SPECIMEN: NORMAL
IMM GRANULOCYTES # BLD: 0.01 10*3/MM3 (ref 0–0.03)
IMM GRANULOCYTES NFR BLD: 0.2 % (ref 0–0.5)
KETONES UR QL STRIP: NEGATIVE
LEUKOCYTE ESTERASE UR QL STRIP.AUTO: NEGATIVE
LYMPHOCYTES # BLD AUTO: 1.55 10*3/MM3 (ref 0.9–4.8)
LYMPHOCYTES NFR BLD AUTO: 26.9 % (ref 19.6–45.3)
MCH RBC QN AUTO: 31.3 PG (ref 26.9–32)
MCHC RBC AUTO-ENTMCNC: 33.4 G/DL (ref 32.4–36.3)
MCV RBC AUTO: 93.6 FL (ref 80.5–98.2)
MONOCYTES # BLD AUTO: 0.34 10*3/MM3 (ref 0.2–1.2)
MONOCYTES NFR BLD AUTO: 5.9 % (ref 5–12)
NEUTROPHILS # BLD AUTO: 3.82 10*3/MM3 (ref 1.9–8.1)
NEUTROPHILS NFR BLD AUTO: 66.4 % (ref 42.7–76)
NITRITE UR QL STRIP: NEGATIVE
PH UR STRIP.AUTO: 7.5 [PH] (ref 5–8)
PLATELET # BLD AUTO: 188 10*3/MM3 (ref 140–500)
PMV BLD AUTO: 10.5 FL (ref 6–12)
POTASSIUM BLD-SCNC: 4.2 MMOL/L (ref 3.5–5.2)
PROT SERPL-MCNC: 7.4 G/DL (ref 6–8.5)
PROT UR QL STRIP: NEGATIVE
RBC # BLD AUTO: 4.19 10*6/MM3 (ref 3.9–5.2)
SODIUM BLD-SCNC: 136 MMOL/L (ref 136–145)
SP GR UR STRIP: 1.01 (ref 1–1.03)
UROBILINOGEN UR QL STRIP: ABNORMAL
WBC NRBC COR # BLD: 5.76 10*3/MM3 (ref 4.5–10.7)
WHOLE BLOOD HOLD SPECIMEN: NORMAL
WHOLE BLOOD HOLD SPECIMEN: NORMAL

## 2018-07-05 PROCEDURE — 99284 EMERGENCY DEPT VISIT MOD MDM: CPT

## 2018-07-05 PROCEDURE — 81003 URINALYSIS AUTO W/O SCOPE: CPT | Performed by: EMERGENCY MEDICINE

## 2018-07-05 PROCEDURE — 80053 COMPREHEN METABOLIC PANEL: CPT | Performed by: EMERGENCY MEDICINE

## 2018-07-05 PROCEDURE — 71101 X-RAY EXAM UNILAT RIBS/CHEST: CPT

## 2018-07-05 PROCEDURE — 85025 COMPLETE CBC W/AUTO DIFF WBC: CPT | Performed by: EMERGENCY MEDICINE

## 2018-07-05 RX ORDER — SODIUM CHLORIDE 0.9 % (FLUSH) 0.9 %
10 SYRINGE (ML) INJECTION AS NEEDED
Status: DISCONTINUED | OUTPATIENT
Start: 2018-07-05 | End: 2018-07-05 | Stop reason: HOSPADM

## 2018-07-05 RX ORDER — METRONIDAZOLE 250 MG/1
250 TABLET ORAL 3 TIMES DAILY
Qty: 21 TABLET | Refills: 0 | Status: SHIPPED | OUTPATIENT
Start: 2018-07-05

## 2018-07-05 NOTE — ED NOTES
Message left for son by Anisha RAZA stating pt is being discharged, son asked EMS to have RN call when pt was ready to be discharged on arrival.      Wanda Rae RN  07/05/18 0982

## 2018-07-05 NOTE — PROGRESS NOTES
Called son Hal Brown to notify patient is being discharged home; Son verbalized he would come to  patient. Notified Alexandra RN and charge RN Charley. Zaida Ruano RN

## 2018-07-05 NOTE — ED NOTES
Pt's son would like a phone call when pt is ready to go home at [phone]. Pt's son states pt has not been to the dr in 3 years. Pt is not on any medications.     Cristal Gonzalez RN  07/05/18 2093

## 2018-07-05 NOTE — ED TRIAGE NOTES
Son called for pt after fall. Patient complains of right shoulder pain and thoracic pain.  Pt was walking from living room to kitchen.  Pt reports hitting her head but denies LOC.  Pt is alert and oriented  X 2. Pt has dementia.

## 2018-07-05 NOTE — ED NOTES
Pt to ED via EMS s/p reported fall at home today and pain to R posterior shoulder blade. Pt has knot present to L posterior head      Wanda Rae RN  07/05/18 2608       Wanda Rae RN  07/05/18 8310

## 2018-07-05 NOTE — ED NOTES
son is not coming per triage RN, pt reports R shoulder blade pain. no visible bruising present      Wanda Rae, RN  07/05/18 9178

## 2018-07-05 NOTE — ED PROVIDER NOTES
EMERGENCY DEPARTMENT ENCOUNTER    CHIEF COMPLAINT  Chief Complaint:R shoulder pain  History given by: Pt  History limited by: hard of hearing, demented   Room Number: EDWR/WR  PMD: Alexandra Baptiste MD      HPI:  Pt is a 89 y.o. female who presents complaining of R shoulder pain after running into door at home this morning. History and ROS are limited since pt is hard of hearing and demented.     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   • Urge incontinence of urine 03/08/2016   • Ventricular premature beats 03/08/2016   • Peripheral vascular disease (CMS/HCC) 03/08/2016   • Left bundle branch block (LBBB) 03/08/2016   • Hypothyroidism 03/08/2016   • Hypertension 03/08/2016   • Diabetic peripheral neuropathy (CMS/HCC) 03/08/2016   • Chronic coronary artery disease 03/08/2016   • Auditory hallucination 03/08/2016   • Type 2 diabetes mellitus with neurologic complication (CMS/HCC) 06/07/2016   • History of colonic polyps 10/10/2013   • History of uterine cancer 10/26/2016   • Delusions (CMS/HCC) 10/27/2016   • Severe hearing loss of both ears 01/04/2017     Resolved Ambulatory Problems     Diagnosis Date Noted   • Palpitations 03/08/2016   • Hallucinations 10/27/2016     Past Medical History:   Diagnosis Date   • Atherosclerotic coronary vascular disease    • Breast cancer (CMS/HCC)    • Breast screening    • Chest pain    • Dementia    • Diabetic peripheral neuropathy (CMS/HCC)    • Diverticulosis    • Endometrial cancer (CMS/HCC)    • Esophageal reflux    • H/O Bell's palsy    • H/O electrocardiogram    •  Health care maintenance    • Hyperlipidemia    • Hypertension    • Hypothyroidism    • Inconclusive mammogram    • LBBB (left bundle branch block)    • Numbness in both legs    • Olecranon bursitis    • Osteoarthritis, hip, bilateral    • Palpitations    • Pilonidal cyst    • Premature ventricular contractions    • PVD (peripheral vascular disease) (CMS/HCC)    • Right hip pain    • Thyroid adenoma    • Type 2 diabetes mellitus (CMS/HCC)    • Urge incontinence    • Uterine cancer (CMS/HCC)    • UTI (urinary tract infection)    • Venous insufficiency of lower extremity    • Warthin's tumor        PAST SURGICAL HISTORY  Past Surgical History:   Procedure Laterality Date   • BACK SURGERY      cyst removal   • BREAST BIOPSY     • BREAST LUMPECTOMY Left     1956 & 1958   • CORONARY STENT PLACEMENT      previous   • HIP SURGERY      cyst removal   • HYSTERECTOMY      endometrial CA   • THYROIDECTOMY      near-total       FAMILY HISTORY  Family History   Problem Relation Age of Onset   • Breast cancer Mother    • Diabetes Mother    • Hypertension Mother    • Emphysema Father    • Hypertension Father    • Heart attack Father        SOCIAL HISTORY  Social History     Social History   • Marital status:      Spouse name: N/A   • Number of children: N/A   • Years of education: N/A     Occupational History   • Not on file.     Social History Main Topics   • Smoking status: Never Smoker   • Smokeless tobacco: Not on file   • Alcohol use No   • Drug use: No   • Sexual activity: Not on file     Other Topics Concern   • Not on file     Social History Narrative   • No narrative on file       ALLERGIES  Ciprofloxacin; Diazepam; Hydrocodone; Hydrocodone-acetaminophen; Lidocaine; Penicillins; and Statins    REVIEW OF SYSTEMS  Review of Systems   Unable to perform ROS: Dementia   Musculoskeletal: Positive for arthralgias (R shoulder).       PHYSICAL EXAM  ED Triage Vitals   Temp Heart Rate Resp BP SpO2   07/05/18 1207 07/05/18  1205 07/05/18 1205 07/05/18 1205 07/05/18 1205   99.1 °F (37.3 °C) 84 18 171/86 96 %      Temp src Heart Rate Source Patient Position BP Location FiO2 (%)   07/05/18 1207 07/05/18 1205 07/05/18 1205 07/05/18 1205 --   Tympanic Monitor Sitting Right arm        Physical Exam   Constitutional: No distress.   HENT:   Head: Normocephalic and atraumatic.   Mouth/Throat: Oropharynx is clear and moist.   Eyes:   Unremarkable   Cardiovascular: Normal rate and regular rhythm.    Pulmonary/Chest: Breath sounds normal. No respiratory distress. She exhibits tenderness (R anterior lateral chest wall).   Abdominal: There is no tenderness.   Musculoskeletal: She exhibits no edema or tenderness.   Neurological: She is alert.   Skin: No rash noted.   Nursing note and vitals reviewed.      LAB RESULTS  Lab Results (last 24 hours)     Procedure Component Value Units Date/Time    Urinalysis With Microscopic If Indicated (No Culture) - Urine, Clean Catch [[id number]]  (Abnormal) Collected:  07/05/18 1238    Specimen:  Urine from Urine, Clean Catch Updated:  07/05/18 1255     Color, UA Yellow     Appearance, UA Clear     pH, UA 7.5     Specific Gravity, UA 1.013     Glucose,  mg/dL (Trace) (A)     Ketones, UA Negative     Bilirubin, UA Negative     Blood, UA Negative     Protein, UA Negative     Leuk Esterase, UA Negative     Nitrite, UA Negative     Urobilinogen, UA 0.2 E.U./dL    Narrative:       Urine microscopic not indicated.    CBC & Differential [[id number]] Collected:  07/05/18 1238    Specimen:  Blood Updated:  07/05/18 1323    Narrative:       The following orders were created for panel order CBC & Differential.  Procedure                               Abnormality         Status                     ---------                               -----------         ------                     CBC Auto Differential[[id number]]        Abnormal            Final result                 Please view results for these tests on the individual  orders.    Comprehensive Metabolic Panel [[id number]]  (Abnormal) Collected:  07/05/18 1238    Specimen:  Blood Updated:  07/05/18 1342     Glucose 188 (H) mg/dL      BUN 23 mg/dL      Creatinine 1.04 (H) mg/dL      Sodium 136 mmol/L      Potassium 4.2 mmol/L      Chloride 98 mmol/L      CO2 27.5 mmol/L      Calcium 9.1 mg/dL      Total Protein 7.4 g/dL      Albumin 4.10 g/dL      ALT (SGPT) 9 U/L      AST (SGOT) 11 U/L      Alkaline Phosphatase 108 U/L      Total Bilirubin 0.4 mg/dL      eGFR Non African Amer 50 (L) mL/min/1.73      Globulin 3.3 gm/dL      A/G Ratio 1.2 g/dL      BUN/Creatinine Ratio 22.1     Anion Gap 10.5 mmol/L     Narrative:       The MDRD GFR formula is only valid for adults with stable renal function between ages 18 and 70.    CBC Auto Differential [[id number]]  (Abnormal) Collected:  07/05/18 1238    Specimen:  Blood Updated:  07/05/18 1323     WBC 5.76 10*3/mm3      RBC 4.19 10*6/mm3      Hemoglobin 13.1 g/dL      Hematocrit 39.2 %      MCV 93.6 fL      MCH 31.3 pg      MCHC 33.4 g/dL      RDW 13.2 (H) %      RDW-SD 45.0 fl      MPV 10.5 fL      Platelets 188 10*3/mm3      Neutrophil % 66.4 %      Lymphocyte % 26.9 %      Monocyte % 5.9 %      Eosinophil % 0.3 %      Basophil % 0.5 %      Immature Grans % 0.2 %      Neutrophils, Absolute 3.82 10*3/mm3      Lymphocytes, Absolute 1.55 10*3/mm3      Monocytes, Absolute 0.34 10*3/mm3      Eosinophils, Absolute 0.02 10*3/mm3      Basophils, Absolute 0.03 10*3/mm3      Immature Grans, Absolute 0.01 10*3/mm3           I ordered the above labs and reviewed the results    RADIOLOGY  XR Ribs Right With PA Chest   Final Result       No acute right rib fracture is identified. Follow up as indications   persist.               This report was finalized on 7/5/2018 1:56 PM by Dr. Jose Alberto Lieberman M.D.               I ordered the above noted radiological studies. Interpreted by radiologist. . Reviewed by me in PACS.        PROCEDURES  Procedures      PROGRESS AND CONSULTS    1237  Urinalysis was ordered    1312  XR R Ribs ordered. Labs ordered.    Plan is to return the pt home with a follow up with PCP.       MEDICAL DECISION MAKING  Results were reviewed/discussed with the patient and they were also made aware of online access. Pt also made aware that some labs, such as cultures, will not be resulted during ER visit and follow up with PMD is necessary.     MDM  Number of Diagnoses or Management Options  Contusion of right chest wall, initial encounter:   Diarrhea of presumed infectious origin:   Fall, initial encounter:      Amount and/or Complexity of Data Reviewed  Clinical lab tests: reviewed and ordered (WBC - 5.76)  Tests in the radiology section of CPT®: reviewed and ordered (XR Ribs-nothing acute)  Decide to obtain previous medical records or to obtain history from someone other than the patient: yes           DIAGNOSIS  Final diagnoses:   Fall, initial encounter   Contusion of right chest wall, initial encounter   Diarrhea of presumed infectious origin       DISPOSITION  DISCHARGE    Patient discharged in stable condition.    Reviewed implications of results, diagnosis, meds, responsibility to follow up, warning signs and symptoms of possible worsening, potential complications and reasons to return to ER, including new or worsening sx.    Patient/Family voiced understanding of above instructions.    Discussed plan for discharge, as there is no emergent indication for admission. Patient referred to primary care provider for BP management due to today's BP. Pt/family is agreeable and understands need for follow up and repeat testing.  Pt is aware that discharge does not mean that nothing is wrong but it indicates no emergency is present that requires admission and they must continue care with follow-up as given below or physician of their choice.     FOLLOW-UP  Alexandra Baptiste MD  [address]  [address]  [phone]    Schedule an appointment as soon as possible for a visit       [facility] Emergency Department  [address]  [facility] [address] [phone]    If symptoms worsen         Medication List      New Prescriptions    metroNIDAZOLE 250 MG tablet  Commonly known as:  FLAGYL  Take 1 tablet by mouth 3 (Three) Times a Day.              Latest Documented Vital Signs:  As of 3:58 PM  BP- (!) 197/85 HR- 80 Temp- 99.1 °F (37.3 °C) (Tympanic) O2 sat- 94%    --  Documentation assistance provided by jorge Collins and Beba Wright for Dr. Goncalves.  Information recorded by the scribe was done at my direction and has been verified and validated by me.     Beba Wright  07/05/18 1532       Bijan Collins  07/05/18 1556       Bijan Collins  07/05/18 1552       Harsh Goncalves MD  07/05/18 1555

## 2018-07-10 ENCOUNTER — EPISODE CHANGES (OUTPATIENT)
Dept: CASE MANAGEMENT | Facility: OTHER | Age: 83
End: 2018-07-10

## 2020-01-01 ENCOUNTER — APPOINTMENT (OUTPATIENT)
Dept: GENERAL RADIOLOGY | Facility: HOSPITAL | Age: 85
End: 2020-01-01

## 2020-01-01 ENCOUNTER — APPOINTMENT (OUTPATIENT)
Dept: CT IMAGING | Facility: HOSPITAL | Age: 85
End: 2020-01-01

## 2020-01-01 ENCOUNTER — HOSPITAL ENCOUNTER (INPATIENT)
Facility: HOSPITAL | Age: 85
LOS: 4 days | End: 2020-08-12
Attending: EMERGENCY MEDICINE | Admitting: INTERNAL MEDICINE

## 2020-01-01 VITALS
BODY MASS INDEX: 18.91 KG/M2 | OXYGEN SATURATION: 82 % | WEIGHT: 127.65 LBS | HEIGHT: 69 IN | TEMPERATURE: 94.5 F | SYSTOLIC BLOOD PRESSURE: 92 MMHG | DIASTOLIC BLOOD PRESSURE: 58 MMHG

## 2020-01-01 DIAGNOSIS — J96.01 ACUTE HYPOXEMIC RESPIRATORY FAILURE (HCC): Primary | ICD-10-CM

## 2020-01-01 DIAGNOSIS — E87.0 HYPERNATREMIA: ICD-10-CM

## 2020-01-01 DIAGNOSIS — U07.1 COVID-19 VIRUS INFECTION: ICD-10-CM

## 2020-01-01 LAB
ALBUMIN SERPL-MCNC: 3.4 G/DL (ref 3.5–5.2)
ALBUMIN/GLOB SERPL: 1 G/DL
ALP SERPL-CCNC: 54 U/L (ref 39–117)
ALT SERPL W P-5'-P-CCNC: 18 U/L (ref 1–33)
ANION GAP SERPL CALCULATED.3IONS-SCNC: 11.1 MMOL/L (ref 5–15)
ANION GAP SERPL CALCULATED.3IONS-SCNC: 11.6 MMOL/L (ref 5–15)
ANION GAP SERPL CALCULATED.3IONS-SCNC: 14 MMOL/L (ref 5–15)
ANION GAP SERPL CALCULATED.3IONS-SCNC: 15.6 MMOL/L (ref 5–15)
ARTERIAL PATENCY WRIST A: ABNORMAL
AST SERPL-CCNC: 16 U/L (ref 1–32)
ATMOSPHERIC PRESS: 751.5 MMHG
ATMOSPHERIC PRESS: 751.7 MMHG
ATMOSPHERIC PRESS: 754.1 MMHG
ATMOSPHERIC PRESS: 756.2 MMHG
BACTERIA SPEC RESP CULT: NORMAL
BASE EXCESS BLDA CALC-SCNC: -0.9 MMOL/L (ref 0–2)
BASE EXCESS BLDA CALC-SCNC: -1.1 MMOL/L (ref 0–2)
BASE EXCESS BLDA CALC-SCNC: -3.2 MMOL/L (ref 0–2)
BASE EXCESS BLDA CALC-SCNC: 0.1 MMOL/L (ref 0–2)
BASOPHILS # BLD AUTO: 0.01 10*3/MM3 (ref 0–0.2)
BASOPHILS NFR BLD AUTO: 0.1 % (ref 0–1.5)
BDY SITE: ABNORMAL
BILIRUB SERPL-MCNC: 0.3 MG/DL (ref 0–1.2)
BUN SERPL-MCNC: 45 MG/DL (ref 8–23)
BUN SERPL-MCNC: 47 MG/DL (ref 8–23)
BUN SERPL-MCNC: 51 MG/DL (ref 8–23)
BUN SERPL-MCNC: 73 MG/DL (ref 8–23)
BUN/CREAT SERPL: 39.5 (ref 7–25)
BUN/CREAT SERPL: 40.5 (ref 7–25)
BUN/CREAT SERPL: 44 (ref 7–25)
BUN/CREAT SERPL: 45.5 (ref 7–25)
CALCIUM SPEC-SCNC: 8.5 MG/DL (ref 8.2–9.6)
CALCIUM SPEC-SCNC: 8.5 MG/DL (ref 8.2–9.6)
CALCIUM SPEC-SCNC: 8.8 MG/DL (ref 8.2–9.6)
CALCIUM SPEC-SCNC: 9.6 MG/DL (ref 8.2–9.6)
CHLORIDE SERPL-SCNC: 121 MMOL/L (ref 98–107)
CHLORIDE SERPL-SCNC: 122 MMOL/L (ref 98–107)
CHLORIDE SERPL-SCNC: 123 MMOL/L (ref 98–107)
CHLORIDE SERPL-SCNC: 123 MMOL/L (ref 98–107)
CHLORIDE UR-SCNC: <20 MMOL/L
CO2 SERPL-SCNC: 20.9 MMOL/L (ref 22–29)
CO2 SERPL-SCNC: 21.4 MMOL/L (ref 22–29)
CO2 SERPL-SCNC: 23.4 MMOL/L (ref 22–29)
CO2 SERPL-SCNC: 26 MMOL/L (ref 22–29)
CREAT SERPL-MCNC: 0.99 MG/DL (ref 0.57–1)
CREAT SERPL-MCNC: 1.19 MG/DL (ref 0.57–1)
CREAT SERPL-MCNC: 1.26 MG/DL (ref 0.57–1)
CREAT SERPL-MCNC: 1.66 MG/DL (ref 0.57–1)
CREAT UR-MCNC: 135.4 MG/DL
CRP SERPL-MCNC: 10.32 MG/DL (ref 0–0.5)
D DIMER PPP FEU-MCNC: 2.42 MCGFEU/ML (ref 0–0.49)
D-LACTATE SERPL-SCNC: 3 MMOL/L (ref 0.5–2)
D-LACTATE SERPL-SCNC: 3.6 MMOL/L (ref 0.5–2)
DEPRECATED RDW RBC AUTO: 45.2 FL (ref 37–54)
DEPRECATED RDW RBC AUTO: 46 FL (ref 37–54)
DEPRECATED RDW RBC AUTO: 50.2 FL (ref 37–54)
DEPRECATED RDW RBC AUTO: 50.6 FL (ref 37–54)
EOSINOPHIL # BLD AUTO: 0 10*3/MM3 (ref 0–0.4)
EOSINOPHIL NFR BLD AUTO: 0 % (ref 0.3–6.2)
ERYTHROCYTE [DISTWIDTH] IN BLOOD BY AUTOMATED COUNT: 13.3 % (ref 12.3–15.4)
ERYTHROCYTE [DISTWIDTH] IN BLOOD BY AUTOMATED COUNT: 13.3 % (ref 12.3–15.4)
ERYTHROCYTE [DISTWIDTH] IN BLOOD BY AUTOMATED COUNT: 13.5 % (ref 12.3–15.4)
ERYTHROCYTE [DISTWIDTH] IN BLOOD BY AUTOMATED COUNT: 13.7 % (ref 12.3–15.4)
FERRITIN SERPL-MCNC: 817 NG/ML (ref 13–150)
GFR SERPL CREATININE-BSD FRML MDRD: 29 ML/MIN/1.73
GFR SERPL CREATININE-BSD FRML MDRD: 40 ML/MIN/1.73
GFR SERPL CREATININE-BSD FRML MDRD: 43 ML/MIN/1.73
GFR SERPL CREATININE-BSD FRML MDRD: 53 ML/MIN/1.73
GLOBULIN UR ELPH-MCNC: 3.4 GM/DL
GLUCOSE BLDC GLUCOMTR-MCNC: 162 MG/DL (ref 70–130)
GLUCOSE BLDC GLUCOMTR-MCNC: 164 MG/DL (ref 70–130)
GLUCOSE BLDC GLUCOMTR-MCNC: 165 MG/DL (ref 70–130)
GLUCOSE BLDC GLUCOMTR-MCNC: 171 MG/DL (ref 70–130)
GLUCOSE BLDC GLUCOMTR-MCNC: 184 MG/DL (ref 70–130)
GLUCOSE BLDC GLUCOMTR-MCNC: 190 MG/DL (ref 70–130)
GLUCOSE BLDC GLUCOMTR-MCNC: 214 MG/DL (ref 70–130)
GLUCOSE BLDC GLUCOMTR-MCNC: 215 MG/DL (ref 70–130)
GLUCOSE BLDC GLUCOMTR-MCNC: 226 MG/DL (ref 70–130)
GLUCOSE BLDC GLUCOMTR-MCNC: 299 MG/DL (ref 70–130)
GLUCOSE BLDC GLUCOMTR-MCNC: 319 MG/DL (ref 70–130)
GLUCOSE BLDC GLUCOMTR-MCNC: 325 MG/DL (ref 70–130)
GLUCOSE SERPL-MCNC: 169 MG/DL (ref 65–99)
GLUCOSE SERPL-MCNC: 203 MG/DL (ref 65–99)
GLUCOSE SERPL-MCNC: 209 MG/DL (ref 65–99)
GLUCOSE SERPL-MCNC: 233 MG/DL (ref 65–99)
GRAM STN SPEC: NORMAL
HCO3 BLDA-SCNC: 22.4 MMOL/L (ref 22–28)
HCO3 BLDA-SCNC: 22.5 MMOL/L (ref 22–28)
HCO3 BLDA-SCNC: 23.2 MMOL/L (ref 22–28)
HCO3 BLDA-SCNC: 26.8 MMOL/L (ref 22–28)
HCT VFR BLD AUTO: 32.4 % (ref 34–46.6)
HCT VFR BLD AUTO: 36.9 % (ref 34–46.6)
HCT VFR BLD AUTO: 38.4 % (ref 34–46.6)
HCT VFR BLD AUTO: 44.7 % (ref 34–46.6)
HGB BLD-MCNC: 11 G/DL (ref 12–15.9)
HGB BLD-MCNC: 11.8 G/DL (ref 12–15.9)
HGB BLD-MCNC: 12.2 G/DL (ref 12–15.9)
HGB BLD-MCNC: 14.2 G/DL (ref 12–15.9)
HOLD SPECIMEN: NORMAL
HOLD SPECIMEN: NORMAL
IMM GRANULOCYTES # BLD AUTO: 0.03 10*3/MM3 (ref 0–0.05)
IMM GRANULOCYTES # BLD AUTO: 0.06 10*3/MM3 (ref 0–0.05)
IMM GRANULOCYTES # BLD AUTO: 0.08 10*3/MM3 (ref 0–0.05)
IMM GRANULOCYTES # BLD AUTO: 0.08 10*3/MM3 (ref 0–0.05)
IMM GRANULOCYTES NFR BLD AUTO: 0.3 % (ref 0–0.5)
IMM GRANULOCYTES NFR BLD AUTO: 0.7 % (ref 0–0.5)
IMM GRANULOCYTES NFR BLD AUTO: 0.8 % (ref 0–0.5)
IMM GRANULOCYTES NFR BLD AUTO: 0.8 % (ref 0–0.5)
INHALED O2 CONCENTRATION: 100 %
INHALED O2 CONCENTRATION: 30 %
INHALED O2 CONCENTRATION: 30 %
INHALED O2 CONCENTRATION: 40 %
INR PPP: 1.18 (ref 0.9–1.1)
L PNEUMO1 AG UR QL IA: NEGATIVE
LACTATE HOLD SPECIMEN: NORMAL
LDH SERPL-CCNC: 352 U/L (ref 135–214)
LYMPHOCYTES # BLD AUTO: 0.37 10*3/MM3 (ref 0.7–3.1)
LYMPHOCYTES # BLD AUTO: 0.44 10*3/MM3 (ref 0.7–3.1)
LYMPHOCYTES # BLD AUTO: 0.75 10*3/MM3 (ref 0.7–3.1)
LYMPHOCYTES # BLD AUTO: 1.05 10*3/MM3 (ref 0.7–3.1)
LYMPHOCYTES NFR BLD AUTO: 11.5 % (ref 19.6–45.3)
LYMPHOCYTES NFR BLD AUTO: 3.7 % (ref 19.6–45.3)
LYMPHOCYTES NFR BLD AUTO: 5.6 % (ref 19.6–45.3)
LYMPHOCYTES NFR BLD AUTO: 6.2 % (ref 19.6–45.3)
MAGNESIUM SERPL-MCNC: 1.4 MG/DL (ref 1.7–2.3)
MAGNESIUM SERPL-MCNC: 2.4 MG/DL (ref 1.7–2.3)
MCH RBC QN AUTO: 30.9 PG (ref 26.6–33)
MCH RBC QN AUTO: 30.9 PG (ref 26.6–33)
MCH RBC QN AUTO: 31 PG (ref 26.6–33)
MCH RBC QN AUTO: 31.2 PG (ref 26.6–33)
MCHC RBC AUTO-ENTMCNC: 30.7 G/DL (ref 31.5–35.7)
MCHC RBC AUTO-ENTMCNC: 31.8 G/DL (ref 31.5–35.7)
MCHC RBC AUTO-ENTMCNC: 33.1 G/DL (ref 31.5–35.7)
MCHC RBC AUTO-ENTMCNC: 34 G/DL (ref 31.5–35.7)
MCV RBC AUTO: 100.5 FL (ref 79–97)
MCV RBC AUTO: 91 FL (ref 79–97)
MCV RBC AUTO: 93.9 FL (ref 79–97)
MCV RBC AUTO: 98.2 FL (ref 79–97)
MODALITY: ABNORMAL
MONOCYTES # BLD AUTO: 0.31 10*3/MM3 (ref 0.1–0.9)
MONOCYTES # BLD AUTO: 0.35 10*3/MM3 (ref 0.1–0.9)
MONOCYTES # BLD AUTO: 0.35 10*3/MM3 (ref 0.1–0.9)
MONOCYTES # BLD AUTO: 0.41 10*3/MM3 (ref 0.1–0.9)
MONOCYTES NFR BLD AUTO: 2.9 % (ref 5–12)
MONOCYTES NFR BLD AUTO: 3.5 % (ref 5–12)
MONOCYTES NFR BLD AUTO: 3.9 % (ref 5–12)
MONOCYTES NFR BLD AUTO: 4.5 % (ref 5–12)
NEUTROPHILS NFR BLD AUTO: 10.84 10*3/MM3 (ref 1.7–7)
NEUTROPHILS NFR BLD AUTO: 7.04 10*3/MM3 (ref 1.7–7)
NEUTROPHILS NFR BLD AUTO: 7.67 10*3/MM3 (ref 1.7–7)
NEUTROPHILS NFR BLD AUTO: 83.6 % (ref 42.7–76)
NEUTROPHILS NFR BLD AUTO: 89.6 % (ref 42.7–76)
NEUTROPHILS NFR BLD AUTO: 9.09 10*3/MM3 (ref 1.7–7)
NEUTROPHILS NFR BLD AUTO: 90.1 % (ref 42.7–76)
NEUTROPHILS NFR BLD AUTO: 91.9 % (ref 42.7–76)
NRBC BLD AUTO-RTO: 0 /100 WBC (ref 0–0.2)
NT-PROBNP SERPL-MCNC: 1582 PG/ML (ref 0–1800)
O2 A-A PPRESDIFF RESPIRATORY: 0.3 MMHG
O2 A-A PPRESDIFF RESPIRATORY: 0.5 MMHG
O2 A-A PPRESDIFF RESPIRATORY: 0.5 MMHG
O2 A-A PPRESDIFF RESPIRATORY: 0.6 MMHG
PCO2 BLDA: 28.8 MM HG (ref 35–45)
PCO2 BLDA: 32.1 MM HG (ref 35–45)
PCO2 BLDA: 36.4 MM HG (ref 35–45)
PCO2 BLDA: 75.9 MM HG (ref 35–45)
PEEP RESPIRATORY: 7.5 CM[H2O]
PH BLDA: 7.16 PH UNITS (ref 7.35–7.45)
PH BLDA: 7.41 PH UNITS (ref 7.35–7.45)
PH BLDA: 7.45 PH UNITS (ref 7.35–7.45)
PH BLDA: 7.5 PH UNITS (ref 7.35–7.45)
PHOSPHATE SERPL-MCNC: 1.1 MG/DL (ref 2.5–4.5)
PLATELET # BLD AUTO: 155 10*3/MM3 (ref 140–450)
PLATELET # BLD AUTO: 162 10*3/MM3 (ref 140–450)
PLATELET # BLD AUTO: 176 10*3/MM3 (ref 140–450)
PLATELET # BLD AUTO: 195 10*3/MM3 (ref 140–450)
PMV BLD AUTO: 11.4 FL (ref 6–12)
PMV BLD AUTO: 11.6 FL (ref 6–12)
PMV BLD AUTO: 11.7 FL (ref 6–12)
PMV BLD AUTO: 12.4 FL (ref 6–12)
PO2 BLDA: 107.4 MM HG (ref 80–100)
PO2 BLDA: 349.3 MM HG (ref 80–100)
PO2 BLDA: 86.3 MM HG (ref 80–100)
PO2 BLDA: 95.6 MM HG (ref 80–100)
POTASSIUM SERPL-SCNC: 2.6 MMOL/L (ref 3.5–5.2)
POTASSIUM SERPL-SCNC: 2.7 MMOL/L (ref 3.5–5.2)
POTASSIUM SERPL-SCNC: 3.7 MMOL/L (ref 3.5–5.2)
POTASSIUM SERPL-SCNC: 4 MMOL/L (ref 3.5–5.2)
POTASSIUM SERPL-SCNC: 4.2 MMOL/L (ref 3.5–5.2)
PROCALCITONIN SERPL-MCNC: 0.78 NG/ML (ref 0–0.25)
PROT SERPL-MCNC: 6.8 G/DL (ref 6–8.5)
PROT UR-MCNC: 37 MG/DL
PROT/CREAT UR: 273.3 MG/G CREA (ref 0–200)
PROTHROMBIN TIME: 14.9 SECONDS (ref 11.7–14.2)
RBC # BLD AUTO: 3.56 10*6/MM3 (ref 3.77–5.28)
RBC # BLD AUTO: 3.82 10*6/MM3 (ref 3.77–5.28)
RBC # BLD AUTO: 3.93 10*6/MM3 (ref 3.77–5.28)
RBC # BLD AUTO: 4.55 10*6/MM3 (ref 3.77–5.28)
S PNEUM AG SPEC QL LA: NEGATIVE
SAO2 % BLDCOA: 97.1 % (ref 92–99)
SAO2 % BLDCOA: 98.2 % (ref 92–99)
SAO2 % BLDCOA: 98.3 % (ref 92–99)
SAO2 % BLDCOA: 99.9 % (ref 92–99)
SET MECH RESP RATE: 16
SET MECH RESP RATE: 22
SODIUM SERPL-SCNC: 153 MMOL/L (ref 136–145)
SODIUM SERPL-SCNC: 158 MMOL/L (ref 136–145)
SODIUM SERPL-SCNC: 159 MMOL/L (ref 136–145)
SODIUM SERPL-SCNC: 163 MMOL/L (ref 136–145)
SODIUM UR-SCNC: <20 MMOL/L
TOTAL RATE: 14 BREATHS/MINUTE
TOTAL RATE: 16 BREATHS/MINUTE
TOTAL RATE: 22 BREATHS/MINUTE
TOTAL RATE: 22 BREATHS/MINUTE
TROPONIN T SERPL-MCNC: 0.02 NG/ML (ref 0–0.03)
VENTILATOR MODE: ABNORMAL
VENTILATOR MODE: AC
VT ON VENT VENT: 500 ML
WBC # BLD AUTO: 12.03 10*3/MM3 (ref 3.4–10.8)
WBC # BLD AUTO: 7.86 10*3/MM3 (ref 3.4–10.8)
WBC # BLD AUTO: 9.17 10*3/MM3 (ref 3.4–10.8)
WBC # BLD AUTO: 9.9 10*3/MM3 (ref 3.4–10.8)
WHOLE BLOOD HOLD SPECIMEN: NORMAL
WHOLE BLOOD HOLD SPECIMEN: NORMAL

## 2020-01-01 PROCEDURE — 5A1945Z RESPIRATORY VENTILATION, 24-96 CONSECUTIVE HOURS: ICD-10-PCS | Performed by: INTERNAL MEDICINE

## 2020-01-01 PROCEDURE — 25010000002 FENTANYL CITRATE (PF) 100 MCG/2ML SOLUTION: Performed by: INTERNAL MEDICINE

## 2020-01-01 PROCEDURE — 94799 UNLISTED PULMONARY SVC/PX: CPT

## 2020-01-01 PROCEDURE — 63710000001 INSULIN LISPRO (HUMAN) PER 5 UNITS: Performed by: INTERNAL MEDICINE

## 2020-01-01 PROCEDURE — 87899 AGENT NOS ASSAY W/OPTIC: CPT | Performed by: INTERNAL MEDICINE

## 2020-01-01 PROCEDURE — 70450 CT HEAD/BRAIN W/O DYE: CPT

## 2020-01-01 PROCEDURE — 83735 ASSAY OF MAGNESIUM: CPT | Performed by: INTERNAL MEDICINE

## 2020-01-01 PROCEDURE — 93010 ELECTROCARDIOGRAM REPORT: CPT | Performed by: INTERNAL MEDICINE

## 2020-01-01 PROCEDURE — 25010000002 HEPARIN (PORCINE) PER 1000 UNITS: Performed by: INTERNAL MEDICINE

## 2020-01-01 PROCEDURE — 82728 ASSAY OF FERRITIN: CPT | Performed by: INTERNAL MEDICINE

## 2020-01-01 PROCEDURE — 25010000002 MORPHINE PER 10 MG: Performed by: INTERNAL MEDICINE

## 2020-01-01 PROCEDURE — 83615 LACTATE (LD) (LDH) ENZYME: CPT | Performed by: INTERNAL MEDICINE

## 2020-01-01 PROCEDURE — 80048 BASIC METABOLIC PNL TOTAL CA: CPT | Performed by: INTERNAL MEDICINE

## 2020-01-01 PROCEDURE — 25010000002 LORAZEPAM PER 2 MG: Performed by: INTERNAL MEDICINE

## 2020-01-01 PROCEDURE — 99291 CRITICAL CARE FIRST HOUR: CPT

## 2020-01-01 PROCEDURE — 87205 SMEAR GRAM STAIN: CPT | Performed by: INTERNAL MEDICINE

## 2020-01-01 PROCEDURE — 86140 C-REACTIVE PROTEIN: CPT | Performed by: INTERNAL MEDICINE

## 2020-01-01 PROCEDURE — 25010000002 PROPOFOL 10 MG/ML EMULSION: Performed by: EMERGENCY MEDICINE

## 2020-01-01 PROCEDURE — 83880 ASSAY OF NATRIURETIC PEPTIDE: CPT | Performed by: EMERGENCY MEDICINE

## 2020-01-01 PROCEDURE — 36600 WITHDRAWAL OF ARTERIAL BLOOD: CPT

## 2020-01-01 PROCEDURE — 82803 BLOOD GASES ANY COMBINATION: CPT

## 2020-01-01 PROCEDURE — 84156 ASSAY OF PROTEIN URINE: CPT | Performed by: INTERNAL MEDICINE

## 2020-01-01 PROCEDURE — 74018 RADEX ABDOMEN 1 VIEW: CPT

## 2020-01-01 PROCEDURE — 82962 GLUCOSE BLOOD TEST: CPT

## 2020-01-01 PROCEDURE — 85025 COMPLETE CBC W/AUTO DIFF WBC: CPT | Performed by: EMERGENCY MEDICINE

## 2020-01-01 PROCEDURE — 25010000002 ENOXAPARIN PER 10 MG: Performed by: INTERNAL MEDICINE

## 2020-01-01 PROCEDURE — 25010000002 DEXAMETHASONE PER 1 MG: Performed by: INTERNAL MEDICINE

## 2020-01-01 PROCEDURE — 94003 VENT MGMT INPAT SUBQ DAY: CPT

## 2020-01-01 PROCEDURE — 25010000002 MAGNESIUM SULFATE 2 GM/50ML SOLUTION: Performed by: INTERNAL MEDICINE

## 2020-01-01 PROCEDURE — 94002 VENT MGMT INPAT INIT DAY: CPT

## 2020-01-01 PROCEDURE — 99232 SBSQ HOSP IP/OBS MODERATE 35: CPT | Performed by: INTERNAL MEDICINE

## 2020-01-01 PROCEDURE — 84132 ASSAY OF SERUM POTASSIUM: CPT | Performed by: INTERNAL MEDICINE

## 2020-01-01 PROCEDURE — 31500 INSERT EMERGENCY AIRWAY: CPT

## 2020-01-01 PROCEDURE — 63710000001 INSULIN GLARGINE PER 5 UNITS: Performed by: INTERNAL MEDICINE

## 2020-01-01 PROCEDURE — 99223 1ST HOSP IP/OBS HIGH 75: CPT | Performed by: INTERNAL MEDICINE

## 2020-01-01 PROCEDURE — 85025 COMPLETE CBC W/AUTO DIFF WBC: CPT | Performed by: INTERNAL MEDICINE

## 2020-01-01 PROCEDURE — 84484 ASSAY OF TROPONIN QUANT: CPT | Performed by: EMERGENCY MEDICINE

## 2020-01-01 PROCEDURE — 80053 COMPREHEN METABOLIC PANEL: CPT | Performed by: EMERGENCY MEDICINE

## 2020-01-01 PROCEDURE — 84300 ASSAY OF URINE SODIUM: CPT | Performed by: INTERNAL MEDICINE

## 2020-01-01 PROCEDURE — 25010000002 PROPOFOL 10 MG/ML EMULSION: Performed by: INTERNAL MEDICINE

## 2020-01-01 PROCEDURE — 84100 ASSAY OF PHOSPHORUS: CPT | Performed by: INTERNAL MEDICINE

## 2020-01-01 PROCEDURE — 84145 PROCALCITONIN (PCT): CPT | Performed by: INTERNAL MEDICINE

## 2020-01-01 PROCEDURE — 71045 X-RAY EXAM CHEST 1 VIEW: CPT

## 2020-01-01 PROCEDURE — 83605 ASSAY OF LACTIC ACID: CPT | Performed by: INTERNAL MEDICINE

## 2020-01-01 PROCEDURE — 0BH17EZ INSERTION OF ENDOTRACHEAL AIRWAY INTO TRACHEA, VIA NATURAL OR ARTIFICIAL OPENING: ICD-10-PCS | Performed by: INTERNAL MEDICINE

## 2020-01-01 PROCEDURE — 36415 COLL VENOUS BLD VENIPUNCTURE: CPT | Performed by: INTERNAL MEDICINE

## 2020-01-01 PROCEDURE — 85610 PROTHROMBIN TIME: CPT | Performed by: EMERGENCY MEDICINE

## 2020-01-01 PROCEDURE — 25010000003 POTASSIUM CHLORIDE 10 MEQ/100ML SOLUTION: Performed by: INTERNAL MEDICINE

## 2020-01-01 PROCEDURE — 82570 ASSAY OF URINE CREATININE: CPT | Performed by: INTERNAL MEDICINE

## 2020-01-01 PROCEDURE — 82436 ASSAY OF URINE CHLORIDE: CPT | Performed by: INTERNAL MEDICINE

## 2020-01-01 PROCEDURE — 25010000002 FENTANYL CITRATE (PF) 100 MCG/2ML SOLUTION: Performed by: EMERGENCY MEDICINE

## 2020-01-01 PROCEDURE — 85379 FIBRIN DEGRADATION QUANT: CPT | Performed by: INTERNAL MEDICINE

## 2020-01-01 PROCEDURE — 87070 CULTURE OTHR SPECIMN AEROBIC: CPT | Performed by: INTERNAL MEDICINE

## 2020-01-01 PROCEDURE — 93005 ELECTROCARDIOGRAM TRACING: CPT | Performed by: EMERGENCY MEDICINE

## 2020-01-01 RX ORDER — CARVEDILOL 25 MG/1
25 TABLET ORAL 2 TIMES DAILY
Status: DISCONTINUED | OUTPATIENT
Start: 2020-01-01 | End: 2020-01-01

## 2020-01-01 RX ORDER — LORAZEPAM 2 MG/ML
2 INJECTION INTRAMUSCULAR
Status: DISCONTINUED | OUTPATIENT
Start: 2020-01-01 | End: 2020-01-01 | Stop reason: HOSPADM

## 2020-01-01 RX ORDER — LORAZEPAM 2 MG/ML
1 INJECTION INTRAMUSCULAR
Status: DISCONTINUED | OUTPATIENT
Start: 2020-01-01 | End: 2020-01-01 | Stop reason: HOSPADM

## 2020-01-01 RX ORDER — HEPARIN SODIUM 5000 [USP'U]/ML
5000 INJECTION, SOLUTION INTRAVENOUS; SUBCUTANEOUS EVERY 12 HOURS SCHEDULED
Status: DISCONTINUED | OUTPATIENT
Start: 2020-01-01 | End: 2020-01-01

## 2020-01-01 RX ORDER — ACETAMINOPHEN 325 MG/1
650 TABLET ORAL EVERY 4 HOURS PRN
Status: DISCONTINUED | OUTPATIENT
Start: 2020-01-01 | End: 2020-01-01 | Stop reason: HOSPADM

## 2020-01-01 RX ORDER — SODIUM CHLORIDE 0.9 % (FLUSH) 0.9 %
10 SYRINGE (ML) INJECTION AS NEEDED
Status: DISCONTINUED | OUTPATIENT
Start: 2020-01-01 | End: 2020-01-01 | Stop reason: HOSPADM

## 2020-01-01 RX ORDER — ACETAMINOPHEN 160 MG/5ML
650 SOLUTION ORAL EVERY 4 HOURS PRN
Status: DISCONTINUED | OUTPATIENT
Start: 2020-01-01 | End: 2020-01-01 | Stop reason: HOSPADM

## 2020-01-01 RX ORDER — NICOTINE POLACRILEX 4 MG
15 LOZENGE BUCCAL
Status: DISCONTINUED | OUTPATIENT
Start: 2020-01-01 | End: 2020-01-01

## 2020-01-01 RX ORDER — HYDRALAZINE HYDROCHLORIDE 20 MG/ML
10 INJECTION INTRAMUSCULAR; INTRAVENOUS EVERY 4 HOURS PRN
Status: DISCONTINUED | OUTPATIENT
Start: 2020-01-01 | End: 2020-01-01

## 2020-01-01 RX ORDER — ASPIRIN 81 MG/1
81 TABLET, CHEWABLE ORAL DAILY
Status: DISCONTINUED | OUTPATIENT
Start: 2020-01-01 | End: 2020-01-01

## 2020-01-01 RX ORDER — LEVOTHYROXINE SODIUM 88 UG/1
88 TABLET ORAL
Status: DISCONTINUED | OUTPATIENT
Start: 2020-01-01 | End: 2020-01-01

## 2020-01-01 RX ORDER — GINSENG 100 MG
CAPSULE ORAL EVERY 12 HOURS SCHEDULED
Status: DISCONTINUED | OUTPATIENT
Start: 2020-01-01 | End: 2020-01-01 | Stop reason: HOSPADM

## 2020-01-01 RX ORDER — MAGNESIUM SULFATE HEPTAHYDRATE 40 MG/ML
4 INJECTION, SOLUTION INTRAVENOUS AS NEEDED
Status: DISCONTINUED | OUTPATIENT
Start: 2020-01-01 | End: 2020-01-01

## 2020-01-01 RX ORDER — DEXTROSE AND SODIUM CHLORIDE 5; .45 G/100ML; G/100ML
40 INJECTION, SOLUTION INTRAVENOUS CONTINUOUS
Status: DISCONTINUED | OUTPATIENT
Start: 2020-01-01 | End: 2020-01-01

## 2020-01-01 RX ORDER — DOCUSATE SODIUM 50 MG/5 ML
100 LIQUID (ML) ORAL 2 TIMES DAILY
Status: DISCONTINUED | OUTPATIENT
Start: 2020-01-01 | End: 2020-01-01

## 2020-01-01 RX ORDER — MORPHINE SULFATE 2 MG/ML
2 INJECTION, SOLUTION INTRAMUSCULAR; INTRAVENOUS
Status: DISCONTINUED | OUTPATIENT
Start: 2020-01-01 | End: 2020-01-01 | Stop reason: HOSPADM

## 2020-01-01 RX ORDER — BISACODYL 10 MG
10 SUPPOSITORY, RECTAL RECTAL DAILY PRN
Status: DISCONTINUED | OUTPATIENT
Start: 2020-01-01 | End: 2020-01-01 | Stop reason: HOSPADM

## 2020-01-01 RX ORDER — POTASSIUM CHLORIDE 1.5 G/1.77G
40 POWDER, FOR SOLUTION ORAL AS NEEDED
Status: DISCONTINUED | OUTPATIENT
Start: 2020-01-01 | End: 2020-01-01

## 2020-01-01 RX ORDER — POTASSIUM CHLORIDE 7.45 MG/ML
10 INJECTION INTRAVENOUS
Status: DISCONTINUED | OUTPATIENT
Start: 2020-01-01 | End: 2020-01-01

## 2020-01-01 RX ORDER — ONDANSETRON 4 MG/1
4 TABLET, FILM COATED ORAL EVERY 6 HOURS PRN
Status: DISCONTINUED | OUTPATIENT
Start: 2020-01-01 | End: 2020-01-01 | Stop reason: HOSPADM

## 2020-01-01 RX ORDER — ACETAMINOPHEN 650 MG/1
650 SUPPOSITORY RECTAL EVERY 4 HOURS PRN
Status: DISCONTINUED | OUTPATIENT
Start: 2020-01-01 | End: 2020-01-01 | Stop reason: HOSPADM

## 2020-01-01 RX ORDER — INSULIN GLARGINE 100 [IU]/ML
15 INJECTION, SOLUTION SUBCUTANEOUS EVERY 12 HOURS SCHEDULED
Status: DISCONTINUED | OUTPATIENT
Start: 2020-01-01 | End: 2020-01-01

## 2020-01-01 RX ORDER — DEXTROSE MONOHYDRATE 50 MG/ML
125 INJECTION, SOLUTION INTRAVENOUS CONTINUOUS
Status: DISCONTINUED | OUTPATIENT
Start: 2020-01-01 | End: 2020-01-01

## 2020-01-01 RX ORDER — FAMOTIDINE 20 MG/1
20 TABLET, FILM COATED ORAL DAILY
Status: DISCONTINUED | OUTPATIENT
Start: 2020-01-01 | End: 2020-01-01

## 2020-01-01 RX ORDER — DEXAMETHASONE SODIUM PHOSPHATE 10 MG/ML
6 INJECTION INTRAMUSCULAR; INTRAVENOUS EVERY 6 HOURS
Status: DISCONTINUED | OUTPATIENT
Start: 2020-01-01 | End: 2020-01-01

## 2020-01-01 RX ORDER — OXYCODONE HYDROCHLORIDE AND ACETAMINOPHEN 5; 325 MG/1; MG/1
1 TABLET ORAL EVERY 4 HOURS PRN
Status: DISCONTINUED | OUTPATIENT
Start: 2020-01-01 | End: 2020-01-01 | Stop reason: HOSPADM

## 2020-01-01 RX ORDER — NALOXONE HCL 0.4 MG/ML
0.1 VIAL (ML) INJECTION
Status: DISCONTINUED | OUTPATIENT
Start: 2020-01-01 | End: 2020-01-01

## 2020-01-01 RX ORDER — POTASSIUM CHLORIDE 750 MG/1
40 CAPSULE, EXTENDED RELEASE ORAL AS NEEDED
Status: DISCONTINUED | OUTPATIENT
Start: 2020-01-01 | End: 2020-01-01

## 2020-01-01 RX ORDER — DEXTROSE MONOHYDRATE 25 G/50ML
25 INJECTION, SOLUTION INTRAVENOUS
Status: DISCONTINUED | OUTPATIENT
Start: 2020-01-01 | End: 2020-01-01

## 2020-01-01 RX ORDER — DIPHENOXYLATE HYDROCHLORIDE AND ATROPINE SULFATE 2.5; .025 MG/1; MG/1
1 TABLET ORAL
Status: DISCONTINUED | OUTPATIENT
Start: 2020-01-01 | End: 2020-01-01 | Stop reason: HOSPADM

## 2020-01-01 RX ORDER — MAGNESIUM SULFATE HEPTAHYDRATE 40 MG/ML
2 INJECTION, SOLUTION INTRAVENOUS AS NEEDED
Status: DISCONTINUED | OUTPATIENT
Start: 2020-01-01 | End: 2020-01-01

## 2020-01-01 RX ORDER — ONDANSETRON 2 MG/ML
4 INJECTION INTRAMUSCULAR; INTRAVENOUS EVERY 6 HOURS PRN
Status: DISCONTINUED | OUTPATIENT
Start: 2020-01-01 | End: 2020-01-01 | Stop reason: HOSPADM

## 2020-01-01 RX ORDER — MORPHINE SULFATE 2 MG/ML
4 INJECTION, SOLUTION INTRAMUSCULAR; INTRAVENOUS
Status: DISCONTINUED | OUTPATIENT
Start: 2020-01-01 | End: 2020-01-01 | Stop reason: HOSPADM

## 2020-01-01 RX ORDER — LORAZEPAM 2 MG/ML
0.5 INJECTION INTRAMUSCULAR
Status: DISCONTINUED | OUTPATIENT
Start: 2020-01-01 | End: 2020-01-01 | Stop reason: HOSPADM

## 2020-01-01 RX ORDER — NYSTATIN 100000 [USP'U]/G
POWDER TOPICAL 3 TIMES DAILY
Status: DISCONTINUED | OUTPATIENT
Start: 2020-01-01 | End: 2020-01-01 | Stop reason: HOSPADM

## 2020-01-01 RX ORDER — ETOMIDATE 2 MG/ML
0.3 INJECTION INTRAVENOUS ONCE
Status: COMPLETED | OUTPATIENT
Start: 2020-01-01 | End: 2020-01-01

## 2020-01-01 RX ORDER — FENTANYL CITRATE 50 UG/ML
25 INJECTION, SOLUTION INTRAMUSCULAR; INTRAVENOUS
Status: DISCONTINUED | OUTPATIENT
Start: 2020-01-01 | End: 2020-01-01

## 2020-01-01 RX ORDER — SODIUM CHLORIDE 0.9 % (FLUSH) 0.9 %
10 SYRINGE (ML) INJECTION EVERY 12 HOURS SCHEDULED
Status: DISCONTINUED | OUTPATIENT
Start: 2020-01-01 | End: 2020-01-01 | Stop reason: HOSPADM

## 2020-01-01 RX ORDER — FENTANYL CITRATE 50 UG/ML
50 INJECTION, SOLUTION INTRAMUSCULAR; INTRAVENOUS ONCE
Status: COMPLETED | OUTPATIENT
Start: 2020-01-01 | End: 2020-01-01

## 2020-01-01 RX ORDER — ROCURONIUM BROMIDE 10 MG/ML
1 INJECTION, SOLUTION INTRAVENOUS ONCE
Status: COMPLETED | OUTPATIENT
Start: 2020-01-01 | End: 2020-01-01

## 2020-01-01 RX ADMIN — INSULIN LISPRO 2 UNITS: 100 INJECTION, SOLUTION INTRAVENOUS; SUBCUTANEOUS at 13:17

## 2020-01-01 RX ADMIN — HEPARIN SODIUM 5000 UNITS: 5000 INJECTION, SOLUTION INTRAVENOUS; SUBCUTANEOUS at 09:22

## 2020-01-01 RX ADMIN — SODIUM PHOSPHATE, MONOBASIC, MONOHYDRATE 20 MMOL: 276; 142 INJECTION, SOLUTION INTRAVENOUS at 14:17

## 2020-01-01 RX ADMIN — FENTANYL CITRATE 50 MCG: 50 INJECTION, SOLUTION INTRAMUSCULAR; INTRAVENOUS at 19:13

## 2020-01-01 RX ADMIN — DOCUSATE SODIUM 100 MG: 50 LIQUID ORAL at 08:46

## 2020-01-01 RX ADMIN — INSULIN GLARGINE 15 UNITS: 100 INJECTION, SOLUTION SUBCUTANEOUS at 09:23

## 2020-01-01 RX ADMIN — DOCUSATE SODIUM 100 MG: 50 LIQUID ORAL at 20:36

## 2020-01-01 RX ADMIN — LORAZEPAM 0.5 MG: 2 INJECTION INTRAMUSCULAR; INTRAVENOUS at 00:49

## 2020-01-01 RX ADMIN — NYSTATIN: 100000 POWDER TOPICAL at 17:12

## 2020-01-01 RX ADMIN — DEXAMETHASONE SODIUM PHOSPHATE 6 MG: 10 INJECTION INTRAMUSCULAR; INTRAVENOUS at 17:03

## 2020-01-01 RX ADMIN — SODIUM CHLORIDE, PRESERVATIVE FREE 10 ML: 5 INJECTION INTRAVENOUS at 08:59

## 2020-01-01 RX ADMIN — INSULIN GLARGINE 15 UNITS: 100 INJECTION, SOLUTION SUBCUTANEOUS at 08:47

## 2020-01-01 RX ADMIN — LORAZEPAM 0.5 MG: 2 INJECTION INTRAMUSCULAR; INTRAVENOUS at 21:25

## 2020-01-01 RX ADMIN — INSULIN LISPRO 4 UNITS: 100 INJECTION, SOLUTION INTRAVENOUS; SUBCUTANEOUS at 17:16

## 2020-01-01 RX ADMIN — FAMOTIDINE 20 MG: 20 TABLET, FILM COATED ORAL at 08:46

## 2020-01-01 RX ADMIN — DEXTROSE MONOHYDRATE 125 ML/HR: 50 INJECTION, SOLUTION INTRAVENOUS at 20:53

## 2020-01-01 RX ADMIN — DOCUSATE SODIUM 100 MG: 50 LIQUID ORAL at 20:50

## 2020-01-01 RX ADMIN — MORPHINE SULFATE 4 MG: 2 INJECTION, SOLUTION INTRAMUSCULAR; INTRAVENOUS at 04:59

## 2020-01-01 RX ADMIN — INSULIN GLARGINE 15 UNITS: 100 INJECTION, SOLUTION SUBCUTANEOUS at 20:42

## 2020-01-01 RX ADMIN — DEXAMETHASONE SODIUM PHOSPHATE 6 MG: 10 INJECTION INTRAMUSCULAR; INTRAVENOUS at 09:00

## 2020-01-01 RX ADMIN — GLYCOPYRROLATE 0.4 MG: 0.2 INJECTION, SOLUTION INTRAMUSCULAR; INTRAVITREAL at 11:58

## 2020-01-01 RX ADMIN — MORPHINE SULFATE 2 MG: 2 INJECTION, SOLUTION INTRAMUSCULAR; INTRAVENOUS at 05:40

## 2020-01-01 RX ADMIN — SODIUM CHLORIDE, PRESERVATIVE FREE 10 ML: 5 INJECTION INTRAVENOUS at 09:39

## 2020-01-01 RX ADMIN — NYSTATIN: 100000 POWDER TOPICAL at 08:46

## 2020-01-01 RX ADMIN — NYSTATIN 1 APPLICATION: 100000 POWDER TOPICAL at 16:19

## 2020-01-01 RX ADMIN — OXYCODONE HYDROCHLORIDE AND ACETAMINOPHEN 1 TABLET: 5; 325 TABLET ORAL at 23:03

## 2020-01-01 RX ADMIN — BACITRACIN: 500 OINTMENT TOPICAL at 09:23

## 2020-01-01 RX ADMIN — PROPOFOL 5 MCG/KG/MIN: 10 INJECTION, EMULSION INTRAVENOUS at 18:56

## 2020-01-01 RX ADMIN — CARVEDILOL 25 MG: 25 TABLET, FILM COATED ORAL at 09:22

## 2020-01-01 RX ADMIN — INSULIN LISPRO 4 UNITS: 100 INJECTION, SOLUTION INTRAVENOUS; SUBCUTANEOUS at 18:08

## 2020-01-01 RX ADMIN — POTASSIUM CHLORIDE 40 MEQ: 1.5 POWDER, FOR SOLUTION ORAL at 13:00

## 2020-01-01 RX ADMIN — MAGNESIUM SULFATE HEPTAHYDRATE 2 G: 40 INJECTION, SOLUTION INTRAVENOUS at 00:15

## 2020-01-01 RX ADMIN — NYSTATIN 1 APPLICATION: 100000 POWDER TOPICAL at 00:22

## 2020-01-01 RX ADMIN — GLYCOPYRROLATE 0.2 MG: 0.2 INJECTION, SOLUTION INTRAMUSCULAR; INTRAVITREAL at 06:34

## 2020-01-01 RX ADMIN — PROPOFOL 5 MCG/KG/MIN: 10 INJECTION, EMULSION INTRAVENOUS at 20:36

## 2020-01-01 RX ADMIN — ASPIRIN 81 MG: 81 TABLET, CHEWABLE ORAL at 08:47

## 2020-01-01 RX ADMIN — POTASSIUM CHLORIDE 10 MEQ: 7.46 INJECTION, SOLUTION INTRAVENOUS at 00:00

## 2020-01-01 RX ADMIN — ENOXAPARIN SODIUM 60 MG: 60 INJECTION SUBCUTANEOUS at 17:03

## 2020-01-01 RX ADMIN — POTASSIUM CHLORIDE 40 MEQ: 1.5 POWDER, FOR SOLUTION ORAL at 17:11

## 2020-01-01 RX ADMIN — GLYCOPYRROLATE 0.4 MG: 0.2 INJECTION, SOLUTION INTRAMUSCULAR; INTRAVITREAL at 04:59

## 2020-01-01 RX ADMIN — DEXAMETHASONE SODIUM PHOSPHATE 6 MG: 10 INJECTION INTRAMUSCULAR; INTRAVENOUS at 04:39

## 2020-01-01 RX ADMIN — FENTANYL CITRATE 25 MCG: 50 INJECTION, SOLUTION INTRAMUSCULAR; INTRAVENOUS at 21:50

## 2020-01-01 RX ADMIN — MORPHINE SULFATE 2 MG: 2 INJECTION, SOLUTION INTRAMUSCULAR; INTRAVENOUS at 06:40

## 2020-01-01 RX ADMIN — HEPARIN SODIUM 5000 UNITS: 5000 INJECTION, SOLUTION INTRAVENOUS; SUBCUTANEOUS at 21:54

## 2020-01-01 RX ADMIN — BACITRACIN: 500 OINTMENT TOPICAL at 20:35

## 2020-01-01 RX ADMIN — DEXAMETHASONE SODIUM PHOSPHATE 6 MG: 10 INJECTION INTRAMUSCULAR; INTRAVENOUS at 04:22

## 2020-01-01 RX ADMIN — DEXAMETHASONE SODIUM PHOSPHATE 6 MG: 10 INJECTION INTRAMUSCULAR; INTRAVENOUS at 22:09

## 2020-01-01 RX ADMIN — LORAZEPAM 0.5 MG: 2 INJECTION INTRAMUSCULAR; INTRAVENOUS at 04:59

## 2020-01-01 RX ADMIN — INSULIN LISPRO 2 UNITS: 100 INJECTION, SOLUTION INTRAVENOUS; SUBCUTANEOUS at 00:00

## 2020-01-01 RX ADMIN — INSULIN LISPRO 7 UNITS: 100 INJECTION, SOLUTION INTRAVENOUS; SUBCUTANEOUS at 09:22

## 2020-01-01 RX ADMIN — DEXAMETHASONE SODIUM PHOSPHATE 6 MG: 10 INJECTION INTRAMUSCULAR; INTRAVENOUS at 00:23

## 2020-01-01 RX ADMIN — SODIUM CHLORIDE, PRESERVATIVE FREE 10 ML: 5 INJECTION INTRAVENOUS at 00:24

## 2020-01-01 RX ADMIN — LEVOTHYROXINE SODIUM 88 MCG: 88 TABLET ORAL at 06:54

## 2020-01-01 RX ADMIN — DEXAMETHASONE SODIUM PHOSPHATE 6 MG: 10 INJECTION INTRAMUSCULAR; INTRAVENOUS at 03:34

## 2020-01-01 RX ADMIN — ASPIRIN 81 MG: 81 TABLET, CHEWABLE ORAL at 09:21

## 2020-01-01 RX ADMIN — POTASSIUM CHLORIDE 10 MEQ: 7.46 INJECTION, SOLUTION INTRAVENOUS at 23:03

## 2020-01-01 RX ADMIN — GLYCOPYRROLATE 0.4 MG: 0.2 INJECTION, SOLUTION INTRAMUSCULAR; INTRAVITREAL at 00:49

## 2020-01-01 RX ADMIN — FAMOTIDINE 20 MG: 20 TABLET, FILM COATED ORAL at 09:21

## 2020-01-01 RX ADMIN — POTASSIUM CHLORIDE 10 MEQ: 7.46 INJECTION, SOLUTION INTRAVENOUS at 03:12

## 2020-01-01 RX ADMIN — POTASSIUM CHLORIDE 40 MEQ: 1.5 POWDER, FOR SOLUTION ORAL at 09:33

## 2020-01-01 RX ADMIN — SODIUM CHLORIDE 1000 ML: 9 INJECTION, SOLUTION INTRAVENOUS at 19:52

## 2020-01-01 RX ADMIN — NYSTATIN: 100000 POWDER TOPICAL at 20:35

## 2020-01-01 RX ADMIN — NYSTATIN: 100000 POWDER TOPICAL at 20:50

## 2020-01-01 RX ADMIN — DEXAMETHASONE SODIUM PHOSPHATE 6 MG: 10 INJECTION INTRAMUSCULAR; INTRAVENOUS at 16:19

## 2020-01-01 RX ADMIN — INSULIN LISPRO 4 UNITS: 100 INJECTION, SOLUTION INTRAVENOUS; SUBCUTANEOUS at 06:22

## 2020-01-01 RX ADMIN — SODIUM CHLORIDE, PRESERVATIVE FREE 10 ML: 5 INJECTION INTRAVENOUS at 20:50

## 2020-01-01 RX ADMIN — ROCURONIUM BROMIDE 70 MG: 50 INJECTION INTRAVENOUS at 18:54

## 2020-01-01 RX ADMIN — SODIUM CHLORIDE, PRESERVATIVE FREE 10 ML: 5 INJECTION INTRAVENOUS at 09:22

## 2020-01-01 RX ADMIN — POTASSIUM CHLORIDE 10 MEQ: 7.46 INJECTION, SOLUTION INTRAVENOUS at 01:13

## 2020-01-01 RX ADMIN — MORPHINE SULFATE 4 MG: 2 INJECTION, SOLUTION INTRAMUSCULAR; INTRAVENOUS at 00:49

## 2020-01-01 RX ADMIN — DEXAMETHASONE SODIUM PHOSPHATE 6 MG: 10 INJECTION INTRAMUSCULAR; INTRAVENOUS at 22:18

## 2020-01-01 RX ADMIN — DOCUSATE SODIUM 100 MG: 50 LIQUID ORAL at 04:39

## 2020-01-01 RX ADMIN — INSULIN LISPRO 6 UNITS: 100 INJECTION, SOLUTION INTRAVENOUS; SUBCUTANEOUS at 12:51

## 2020-01-01 RX ADMIN — NYSTATIN: 100000 POWDER TOPICAL at 09:39

## 2020-01-01 RX ADMIN — DEXTROSE MONOHYDRATE 125 ML/HR: 50 INJECTION, SOLUTION INTRAVENOUS at 13:01

## 2020-01-01 RX ADMIN — DEXTROSE AND SODIUM CHLORIDE 75 ML/HR: 5; 450 INJECTION, SOLUTION INTRAVENOUS at 21:50

## 2020-01-01 RX ADMIN — BACITRACIN: 500 OINTMENT TOPICAL at 08:46

## 2020-01-01 RX ADMIN — MAGNESIUM SULFATE HEPTAHYDRATE 2 G: 40 INJECTION, SOLUTION INTRAVENOUS at 03:14

## 2020-01-01 RX ADMIN — INSULIN LISPRO 2 UNITS: 100 INJECTION, SOLUTION INTRAVENOUS; SUBCUTANEOUS at 00:01

## 2020-01-01 RX ADMIN — SODIUM CHLORIDE 1000 ML: 9 INJECTION, SOLUTION INTRAVENOUS at 19:12

## 2020-01-01 RX ADMIN — BACITRACIN: 500 OINTMENT TOPICAL at 09:38

## 2020-01-01 RX ADMIN — LEVOTHYROXINE SODIUM 88 MCG: 88 TABLET ORAL at 06:02

## 2020-01-01 RX ADMIN — SODIUM CHLORIDE, POTASSIUM CHLORIDE, SODIUM LACTATE AND CALCIUM CHLORIDE 1000 ML: 600; 310; 30; 20 INJECTION, SOLUTION INTRAVENOUS at 17:14

## 2020-01-01 RX ADMIN — MORPHINE SULFATE 2 MG: 2 INJECTION, SOLUTION INTRAMUSCULAR; INTRAVENOUS at 17:12

## 2020-01-01 RX ADMIN — MORPHINE SULFATE 4 MG: 2 INJECTION, SOLUTION INTRAMUSCULAR; INTRAVENOUS at 21:25

## 2020-01-01 RX ADMIN — SODIUM CHLORIDE, PRESERVATIVE FREE 10 ML: 5 INJECTION INTRAVENOUS at 21:25

## 2020-01-01 RX ADMIN — MORPHINE SULFATE 2 MG: 2 INJECTION, SOLUTION INTRAMUSCULAR; INTRAVENOUS at 11:58

## 2020-01-01 RX ADMIN — DEXAMETHASONE SODIUM PHOSPHATE 6 MG: 10 INJECTION INTRAMUSCULAR; INTRAVENOUS at 10:22

## 2020-01-01 RX ADMIN — NYSTATIN: 100000 POWDER TOPICAL at 09:23

## 2020-01-01 RX ADMIN — SODIUM CHLORIDE, PRESERVATIVE FREE 10 ML: 5 INJECTION INTRAVENOUS at 20:36

## 2020-01-01 RX ADMIN — ETOMIDATE 30 MG: 2 INJECTION, SOLUTION INTRAVENOUS at 18:55

## 2020-01-01 RX ADMIN — INSULIN GLARGINE 15 UNITS: 100 INJECTION, SOLUTION SUBCUTANEOUS at 20:49

## 2020-01-01 RX ADMIN — NYSTATIN: 100000 POWDER TOPICAL at 17:03

## 2020-01-01 RX ADMIN — NYSTATIN: 100000 POWDER TOPICAL at 21:25

## 2020-01-01 RX ADMIN — ENOXAPARIN SODIUM 60 MG: 60 INJECTION SUBCUTANEOUS at 16:19

## 2020-08-08 PROBLEM — J96.01 ACUTE HYPOXEMIC RESPIRATORY FAILURE (HCC): Status: ACTIVE | Noted: 2020-01-01

## 2020-08-08 NOTE — ED NOTES
Advised MD Almonte of pt o2 sats, per md moved patients oxygen to 6L per minute     Nga Oscar, RN  08/08/20 1800

## 2020-08-08 NOTE — H&P
History and Physical    Patient Name: Irina Brown  Age/Sex: 89 y.o. female  : 10/4/1928  MRN: [id number]    Date of Admission: 2020  Date of Encounter Visit: 20  Encounter Provider: Pawan Torres MD  Place of Service: [facility]   Patient Care Team:  Alexandra Baptiste MD as PCP - General  Alexandra Baptiste MD as PCP - Family Medicine  Brittni Nunez as PCP - Claims Attributed      Subjective:     Chief Complaint: respiratory failure, COVID-19 pneumonia    History of Present Illness:  Irina Brown is a 89 y.o. female with limited history given the fact that she is currently intubated on the ventilator, but she was brought in because of altered mental status and was found to be hypoxemic.  Patient is already known positive case of COVID-19 pneumonia at her nursing facility, she has history of diabetes with diabetic neuropathy, history of uterine cancer, hypertension, left bundle branch block and peripheral vascular disease with extensive abdominal surgery based on the scars in her abdomen probably from her uterine cancer prior surgery.  History of breast cancer that is currently in remission for years.  Based on the conversation with Dr. Almonte, the patient was a full code per POA, she was hypoxic on high flow nasal cannula oxygen and ended up getting intubated.  Currently she is on the ventilator and we were able to wean her down to 40% FiO2 on a PEEP of 7.5 with sustained oxygenation in the high 90s.  ABG showed some respiratory acidosis and the vent setting were adjusted further.  History is extremely limited because of the fact that she came from the nursing facility rather than home and patient is unable to provide me with any history and the history provided to the emergency room physician was also limited.  I was able to get the information based on her records in the system.  After calling the power of   Mrs. Maria Fernanda Osborn and discussing the case, she was able to give me some more  information, the patient lived in a Alzheimer's unit and she was functional able to walk and was admitted at [facility] on 7/29/2020 with pneumonia that was labeled as COVID-19 pneumonia, was treated and discharged on that 6 to a rehab unit and she did develop the confusion later on and that is when she was brought into [facility] emergency room for further care.    Pulmonary Functions Testing Results:    No results found for: FEV1, FVC, USP6GKZ, TLC, DLCO    Review of Systems:   Review of Systems   Unable to perform ROS: Intubated       Past Medical History:  Past Medical History:   Diagnosis Date   • Atherosclerotic coronary vascular disease    • Breast cancer (CMS/HCC)    • Breast screening    • Chest pain    • Dementia    • Diabetic peripheral neuropathy (CMS/HCC)    • Diverticulosis    • Endometrial cancer (CMS/HCC)    • Esophageal reflux    • H/O Bell's palsy    • H/O electrocardiogram     shows normal sinus rhythm, prematuer ventricular complex.  Otherwise normal appearing ECG   • Health care maintenance    • Hyperlipidemia    • Hypertension    • Hypothyroidism    • Inconclusive mammogram    • LBBB (left bundle branch block)    • Numbness in both legs    • Olecranon bursitis    • Osteoarthritis, hip, bilateral     pain   • Palpitations    • Pilonidal cyst     removed 1950   • Premature ventricular contractions    • PVD (peripheral vascular disease) (CMS/HCC)    • Right hip pain    • Thyroid adenoma    • Type 2 diabetes mellitus (CMS/HCC)    • Urge incontinence    • Uterine cancer (CMS/HCC)    • UTI (urinary tract infection)    • Venous insufficiency of lower extremity    • Warthin's tumor        Past Surgical History:   Procedure Laterality Date   • BACK SURGERY      cyst removal   • BREAST BIOPSY     • BREAST LUMPECTOMY Left     1956 & 1958   • CORONARY STENT PLACEMENT      previous   • HIP SURGERY      cyst removal   • HYSTERECTOMY      endometrial CA   • THYROIDECTOMY      near-total              Home Medications:     (Not in a hospital admission)    Inpatient Medications:  Scheduled Meds:  sodium chloride 1,000 mL Intravenous Once     Continuous Infusions:  propofol 5-50 mcg/kg/min Last Rate: 5 mcg/kg/min (08/08/20 1856)     PRN Meds:.sodium chloride    Allergies:  Allergies   Allergen Reactions   • Ciprofloxacin    • Diazepam    • Hydrocodone    • Hydrocodone-Acetaminophen    • Lidocaine    • Penicillins    • Statins        Past Social History:  Social History     Socioeconomic History   • Marital status:      Spouse name: Not on file   • Number of children: Not on file   • Years of education: Not on file   • Highest education level: Not on file   Tobacco Use   • Smoking status: Never Smoker   Substance and Sexual Activity   • Alcohol use: No   • Drug use: No       Past Family History:  Family History   Problem Relation Age of Onset   • Breast cancer Mother    • Diabetes Mother    • Hypertension Mother    • Emphysema Father    • Hypertension Father    • Heart attack Father            Objective:   Temp:  [99 °F (37.2 °C)] 99 °F (37.2 °C)  Heart Rate:  [100-126] 126  Resp:  [16-24] 16  BP: ()/(52-96) 81/52  FiO2 (%):  [100 %] 100 %   SpO2:  [74 %-100 %] 100 %  on  Flow (L/min):  [4-15] 15 Device (Oxygen Therapy): ventilator    Intake/Output Summary (Last 24 hours) at 8/8/2020 1914  Last data filed at 8/8/2020 1904  Gross per 24 hour   Intake 1000 ml   Output --   Net 1000 ml     Body mass index is 18.8 kg/m².      08/08/20  1715   Weight: 57.7 kg (127 lb 4.8 oz)     Weight change:     Physical Exam:   Physical Exam   General:     Sickly looking old lady that is currently intubated orally, on the ventilator, patient was paralyzed prior to intubation, she is not having any spontaneous movement probably from the effect of the paralytics                   Head:    Normocephalic, atraumatic.   Eyes:          Conjunctivae and sclerae normal, no icterus, pinpoint pupils probably from the fentanyl  used prior to intubation   Throat:   No oral lesions, no thrush, oral mucosa dry.   Neck:   Supple, trachea midline.  No jugular venous distention   Lungs:     Normal chest on inspection, suctioning revealed some brownish slightly bloody secretion probably from the intubation.  Minimal crackles posteriorly with good breath sounds bilaterally.     Heart:    Regular rhythm and mild tachycardia.  No murmurs, gallops, or rubs noted.   Abdomen:     Soft, non-tender, distended  hypoactive bowel sounds.  Resonant to percussion   Extremities:   No clubbing, cyanosis, or edema.  Slightly dry skin with slightly reduced turgor pressure   Pulses:   Pulses palpable and equal bilaterally.    Skin:   No bleeding or rash.  She has some rash over the groin area under the skin foldS   Neuro:  Very limited, the patient is still under the effect of the paralytics .    Psychiatric:  Unable to assess.     Lab Review:   Results from last 7 days   Lab Units 08/08/20  1557   SODIUM mmol/L 163*   POTASSIUM mmol/L 4.0   CHLORIDE mmol/L 123*   CO2 mmol/L 26.0   BUN mg/dL 45*   CREATININE mg/dL 0.99   GLUCOSE mg/dL 169*   CALCIUM mg/dL 9.6   AST (SGOT) U/L 16   ALT (SGPT) U/L 18   ALBUMIN g/dL 3.40*     Results from last 7 days   Lab Units 08/08/20  1557   TROPONIN T ng/mL 0.020     Results from last 7 days   Lab Units 08/08/20  1557   WBC 10*3/mm3 9.17   HEMOGLOBIN g/dL 14.2   HEMATOCRIT % 44.7   PLATELETS 10*3/mm3 195   MCV fL 98.2*   MCH pg 31.2   MCHC g/dL 31.8   RDW % 13.7   RDW-SD fl 50.6   MPV fL 11.4   NEUTROPHIL % % 83.6*   LYMPHOCYTE % % 11.5*   MONOCYTES % % 4.5*   EOSINOPHIL % % 0.0*   BASOPHIL % % 0.1   IMM GRAN % % 0.3   NEUTROS ABS 10*3/mm3 7.67*   LYMPHS ABS 10*3/mm3 1.05   MONOS ABS 10*3/mm3 0.41   EOS ABS 10*3/mm3 0.00   BASOS ABS 10*3/mm3 0.01   IMMATURE GRANS (ABS) 10*3/mm3 0.03   NRBC /100 WBC 0.0     Results from last 7 days   Lab Units 08/08/20  1557   INR  1.18*               Invalid input(s): LDLCALC  Results from last  7 days   Lab Units 08/08/20  1557   PROBNP pg/mL 1,582.0                                           Imaging:  Imaging Results (Most Recent)     Procedure Component Value Units Date/Time    XR Chest 1 View [[id number]] Collected:  08/08/20 1528     Updated:  08/08/20 1630    Narrative:       ONE VIEW PORTABLE CHEST     HISTORY: Shortness of breath.     There is cardiomegaly with considerable interstitial prominence that is  new since a study of 07/05/2018 and likely relates to changes of  congestive heart failure. I cannot completely exclude the possibility of  interstitial pneumonitis and clinical correlation is recommended.     This report was finalized on 8/8/2020 4:26 PM by Dr. Marty Daniels M.D.             I personally viewed and interpreted the patient's imaging studies.    Assessment:     1. Acute hypoxemic respiratory failure requiring intubation and mechanical ventilation  2. Acute hypercapnic respiratory failure with respiratory acidosis  3. COVID-19 pneumonia with previously confirmed COVID at the nursing facility prior to presentation  4. Dehydration with hypernatremia  5. Uremia with BUN at 45, creatinine is comparable to baseline at 1  6. Dilated large bowels on the chest x-ray with mild ileus on exam otherwise benign abdomen  7. History of breast cancer  8. Peripheral vascular disease  9. Type 2 diabetes mellitus    Plan:     Patient is an elderly lady with multiple comorbidities who had a confirmed case of COVID-19 with progression into acute respiratory failure.  Prognosis is pretty poor given her underlying condition.  After discussing the case in details with the POA, the decision was made to continue with the current supportive measures but to consider the patient DNR in case of ventricular fibrillation or to cardiac arrest.  Oxygenation is much better on the ventilator, PCO2 is still elevated and the vent settings were adjusted for a more optimal I:E ratio, patient has no evidence of any airflow  limitation or COPD based on the flow versus time parameters on the ventilator.  We will repeat ABGs in 1 hour and consider further adjustment as needed  The abdominal exam is slightly concerning but no evidence of any acute abdomen, she has dilated large bowel loops but no obvious free air and the abdominal exam does not show any rebound with hypoactive yet positive bowel sounds however patient was under the effect of the paralytics at the time and that might be masking any rebound process.  We will check a lactic acid level, we will check ferritin level, CRP and d-dimer and will track the inflammatory markers.  Patient will be started on dexamethasone and will consult ID to evaluate for the potential use of Remdesivir.  Patient will be on Accu-Chek with sliding scale of insulin to correct for any steroid-induced hyperglycemia  Patient is clinically dry with sodium of 163 with elevated BUN but she is not in renal failure with creatinine comparable to baseline, she already had a liter of lactated Ringer IV and we will start her on D5 half-normal saline since on exam she is only mildly dry and do not recommend to load her up was too much IV fluid given her underlying morbidities  Her echocardiogram from 4/1/2014 showed grade 1 diastolic dysfunction with preserved EF at 63% with severely dilated left atrium with no evidence of any aortic or mitral valve disease except for trace mitral valve regurg  Patient will be admitted to the intensive care unit  Prognosis is poor    Discussed with the ER physician, discussed with the POA      Time: Critical care 40 min    Pawan Torres MD  [facility]   08/08/20  19:14    Dictated utilizing Dragon dictation

## 2020-08-08 NOTE — ED NOTES
Spoke with Pt Health Care POA, advised that pt was very ill. Advised that intubation was a distinct possibility. Allowed POA to speak with MD Almonte.      Nga Oscar RN  08/08/20 8205

## 2020-08-08 NOTE — ED TRIAGE NOTES
Pt was sent to [facility] from [facility] 2 days ago - she is positive covid.  Today she has not had anything to eat or drink and has been unresponsive per nh.  Ems reports she will respond c kayode and abran to sternal rub.  She was 86% this am on 2L NC so was put on nrb at 4L for sats 96%    Patient was placed in face mask during first look triage.  Patient was wearing a face mask throughout encounter.  I wore personal protective equipment throughout the encounter.  Hand hygiene was performed before and after patient encounter.

## 2020-08-08 NOTE — ED PROVIDER NOTES
EMERGENCY DEPARTMENT ENCOUNTER    Room Number:  N325/1  Date of encounter:  8/8/2020  PCP: Alexandra Baptiste MD  Historian: Outside facility      HPI:  Chief Complaint: Altered mental status  A complete HPI/ROS/PMH/PSH/SH/FH are unobtainable due to: Altered mental status    Context: Irina Brown is a 89 y.o. female who presents to the ED c/o altered mental status.  Patient reportedly tested positive for COVID-19 at her facility.  She was sent here for further evaluation.      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   • Urge incontinence of urine 03/08/2016   • Ventricular premature beats 03/08/2016   • Peripheral vascular disease (CMS/HCC) 03/08/2016   • Left bundle branch block (LBBB) 03/08/2016   • Hypothyroidism 03/08/2016   • Hypertension 03/08/2016   • Diabetic peripheral neuropathy (CMS/[facility]) 03/08/2016   • Chronic coronary artery disease 03/08/2016   • Auditory hallucination 03/08/2016   • Type 2 diabetes mellitus with neurologic complication (CMS/[facility]) 06/07/2016   • History of colonic polyps 10/10/2013   • History of uterine cancer 10/26/2016   • Delusions (CMS/[facility]) 10/27/2016   • Severe hearing loss of both ears 01/04/2017     Resolved Ambulatory Problems     Diagnosis Date Noted   • Palpitations 03/08/2016   • Hallucinations 10/27/2016     Past Medical History:   Diagnosis Date   • Atherosclerotic coronary vascular disease    • Breast cancer (CMS/[facility])    • Breast screening    • Chest pain    • Dementia    • Diverticulosis    • Endometrial cancer (CMS/[facility])    • Esophageal reflux    • H/O Bell's palsy    • H/O electrocardiogram    • Health care maintenance    • Inconclusive mammogram    • LBBB (left bundle branch block)    • Numbness in both legs    • Olecranon bursitis    • Osteoarthritis, hip, bilateral    • Pilonidal cyst    • Premature ventricular contractions    • PVD (peripheral vascular disease) (CMS/[facility])     • Right hip pain    • Thyroid adenoma    • Type 2 diabetes mellitus (CMS/HCC)    • Urge incontinence    • Uterine cancer (CMS/HCC)    • UTI (urinary tract infection)    • Venous insufficiency of lower extremity          PAST SURGICAL HISTORY  Past Surgical History:   Procedure Laterality Date   • BACK SURGERY      cyst removal   • BREAST BIOPSY     • BREAST LUMPECTOMY Left     1956 & 1958   • CORONARY STENT PLACEMENT      previous   • HIP SURGERY      cyst removal   • HYSTERECTOMY      endometrial CA   • THYROIDECTOMY      near-total         FAMILY HISTORY  Family History   Problem Relation Age of Onset   • Breast cancer Mother    • Diabetes Mother    • Hypertension Mother    • Emphysema Father    • Hypertension Father    • Heart attack Father          SOCIAL HISTORY  Social History     Socioeconomic History   • Marital status:      Spouse name: Not on file   • Number of children: Not on file   • Years of education: Not on file   • Highest education level: Not on file   Tobacco Use   • Smoking status: Never Smoker   Substance and Sexual Activity   • Alcohol use: No   • Drug use: No         ALLERGIES  Ciprofloxacin; Diazepam; Hydrocodone; Hydrocodone-acetaminophen; Lidocaine; Penicillins; and Statins        REVIEW OF SYSTEMS  Review of Systems     Limited due to patient's mental status.      PHYSICAL EXAM    I have reviewed the triage vital signs and nursing notes.    ED Triage Vitals [08/08/20 1439]   Temp Heart Rate Resp BP SpO2   99 °F (37.2 °C) 105 18 149/80 95 %      Temp src Heart Rate Source Patient Position BP Location FiO2 (%)   Tympanic Monitor -- -- --       Physical Exam  GENERAL: distressed  HENT: nares patent, mucous membranes are very dry  EYES: no scleral icterus  CV: regular rhythm, tachycardic  RESPIRATORY: normal effort, diminished breath sounds bilaterally  ABDOMEN: soft, nontender  MUSCULOSKELETAL: no deformity  NEURO: Localizes to painful stimulus with both arms, she shows some movement  with painful stimulus in her left leg, unable to get her to move her right leg, she makes no sounds with painful stimulus, pupils are 3 mm and minimally reactive bilaterally  SKIN: warm, dry        LAB RESULTS  Recent Results (from the past 24 hour(s))   Comprehensive Metabolic Panel    Collection Time: 08/08/20  3:57 PM   Result Value Ref Range    Glucose 169 (H) 65 - 99 mg/dL    BUN 45 (H) 8 - 23 mg/dL    Creatinine 0.99 0.57 - 1.00 mg/dL    Sodium 163 (C) 136 - 145 mmol/L    Potassium 4.0 3.5 - 5.2 mmol/L    Chloride 123 (H) 98 - 107 mmol/L    CO2 26.0 22.0 - 29.0 mmol/L    Calcium 9.6 8.2 - 9.6 mg/dL    Total Protein 6.8 6.0 - 8.5 g/dL    Albumin 3.40 (L) 3.50 - 5.20 g/dL    ALT (SGPT) 18 1 - 33 U/L    AST (SGOT) 16 1 - 32 U/L    Alkaline Phosphatase 54 39 - 117 U/L    Total Bilirubin 0.3 0.0 - 1.2 mg/dL    eGFR Non African Amer 53 (L) >60 mL/min/1.73    Globulin 3.4 gm/dL    A/G Ratio 1.0 g/dL    BUN/Creatinine Ratio 45.5 (H) 7.0 - 25.0    Anion Gap 14.0 5.0 - 15.0 mmol/L   BNP    Collection Time: 08/08/20  3:57 PM   Result Value Ref Range    proBNP 1,582.0 0.0-1,800.0 pg/mL   Troponin    Collection Time: 08/08/20  3:57 PM   Result Value Ref Range    Troponin T 0.020 0.000 - 0.030 ng/mL   Protime-INR    Collection Time: 08/08/20  3:57 PM   Result Value Ref Range    Protime 14.9 (H) 11.7 - 14.2 Seconds    INR 1.18 (H) 0.90 - 1.10   Light Blue Top    Collection Time: 08/08/20  3:57 PM   Result Value Ref Range    Extra Tube hold for add-on    Green Top (Gel)    Collection Time: 08/08/20  3:57 PM   Result Value Ref Range    Extra Tube Hold for add-ons.    Lavender Top    Collection Time: 08/08/20  3:57 PM   Result Value Ref Range    Extra Tube hold for add-on    Gold Top - SST    Collection Time: 08/08/20  3:57 PM   Result Value Ref Range    Extra Tube Hold for add-ons.    CBC Auto Differential    Collection Time: 08/08/20  3:57 PM   Result Value Ref Range    WBC 9.17 3.40 - 10.80 10*3/mm3    RBC 4.55 3.77 - 5.28  10*6/mm3    Hemoglobin 14.2 12.0 - 15.9 g/dL    Hematocrit 44.7 34.0 - 46.6 %    MCV 98.2 (H) 79.0 - 97.0 fL    MCH 31.2 26.6 - 33.0 pg    MCHC 31.8 31.5 - 35.7 g/dL    RDW 13.7 12.3 - 15.4 %    RDW-SD 50.6 37.0 - 54.0 fl    MPV 11.4 6.0 - 12.0 fL    Platelets 195 140 - 450 10*3/mm3    Neutrophil % 83.6 (H) 42.7 - 76.0 %    Lymphocyte % 11.5 (L) 19.6 - 45.3 %    Monocyte % 4.5 (L) 5.0 - 12.0 %    Eosinophil % 0.0 (L) 0.3 - 6.2 %    Basophil % 0.1 0.0 - 1.5 %    Immature Grans % 0.3 0.0 - 0.5 %    Neutrophils, Absolute 7.67 (H) 1.70 - 7.00 10*3/mm3    Lymphocytes, Absolute 1.05 0.70 - 3.10 10*3/mm3    Monocytes, Absolute 0.41 0.10 - 0.90 10*3/mm3    Eosinophils, Absolute 0.00 0.00 - 0.40 10*3/mm3    Basophils, Absolute 0.01 0.00 - 0.20 10*3/mm3    Immature Grans, Absolute 0.03 0.00 - 0.05 10*3/mm3    nRBC 0.0 0.0 - 0.2 /100 WBC   Blood Gas, Arterial    Collection Time: 08/08/20  7:22 PM   Result Value Ref Range    Site Arterial: left brachial     Amilcar's Test N/A     pH, Arterial 7.156 (C) 7.350 - 7.450 pH units    pCO2, Arterial 75.9 (C) 35.0 - 45.0 mm Hg    pO2, Arterial 349.3 (H) 80.0 - 100.0 mm Hg    HCO3, Arterial 26.8 22.0 - 28.0 mmol/L    Base Excess, Arterial -3.2 (L) 0.0 - 2.0 mmol/L    O2 Saturation Calculated 99.9 (H) 92.0 - 99.0 %    A-a Gradiant 0.5 mmHg    Barometric Pressure for Blood Gas 756.2 mmHg    Modality Adult Vent     FIO2 100 %    Ventilator Mode VC     Set Tidal Volume 500     Set Mech Resp Rate 16     Rate 16 Breaths/minute    PEEP 7.5    POC Glucose Once    Collection Time: 08/08/20  8:36 PM   Result Value Ref Range    Glucose 184 (H) 70 - 130 mg/dL   POC Glucose Once    Collection Time: 08/08/20 10:09 PM   Result Value Ref Range    Glucose 162 (H) 70 - 130 mg/dL   Basic Metabolic Panel    Collection Time: 08/08/20 10:39 PM   Result Value Ref Range    Glucose 209 (H) 65 - 99 mg/dL    BUN 47 (H) 8 - 23 mg/dL    Creatinine 1.19 (H) 0.57 - 1.00 mg/dL    Sodium 159 (H) 136 - 145 mmol/L     Potassium 4.2 3.5 - 5.2 mmol/L    Chloride 122 (H) 98 - 107 mmol/L    CO2 21.4 (L) 22.0 - 29.0 mmol/L    Calcium 8.5 8.2 - 9.6 mg/dL    eGFR Non African Amer 43 (L) >60 mL/min/1.73    BUN/Creatinine Ratio 39.5 (H) 7.0 - 25.0    Anion Gap 15.6 (H) 5.0 - 15.0 mmol/L   Lactic Acid, Plasma    Collection Time: 08/08/20 10:40 PM   Result Value Ref Range    Lactate 3.6 (C) 0.5 - 2.0 mmol/L   CBC Auto Differential    Collection Time: 08/08/20 10:40 PM   Result Value Ref Range    WBC 9.90 3.40 - 10.80 10*3/mm3    RBC 3.82 3.77 - 5.28 10*6/mm3    Hemoglobin 11.8 (L) 12.0 - 15.9 g/dL    Hematocrit 38.4 34.0 - 46.6 %    .5 (H) 79.0 - 97.0 fL    MCH 30.9 26.6 - 33.0 pg    MCHC 30.7 (L) 31.5 - 35.7 g/dL    RDW 13.3 12.3 - 15.4 %    RDW-SD 50.2 37.0 - 54.0 fl    MPV 11.6 6.0 - 12.0 fL    Platelets 155 140 - 450 10*3/mm3    Neutrophil % 91.9 (H) 42.7 - 76.0 %    Lymphocyte % 3.7 (L) 19.6 - 45.3 %    Monocyte % 3.5 (L) 5.0 - 12.0 %    Eosinophil % 0.0 (L) 0.3 - 6.2 %    Basophil % 0.1 0.0 - 1.5 %    Immature Grans % 0.8 (H) 0.0 - 0.5 %    Neutrophils, Absolute 9.09 (H) 1.70 - 7.00 10*3/mm3    Lymphocytes, Absolute 0.37 (L) 0.70 - 3.10 10*3/mm3    Monocytes, Absolute 0.35 0.10 - 0.90 10*3/mm3    Eosinophils, Absolute 0.00 0.00 - 0.40 10*3/mm3    Basophils, Absolute 0.01 0.00 - 0.20 10*3/mm3    Immature Grans, Absolute 0.08 (H) 0.00 - 0.05 10*3/mm3    nRBC 0.0 0.0 - 0.2 /100 WBC   Blood Gas, Arterial    Collection Time: 08/08/20 11:31 PM   Result Value Ref Range    Site Arterial: right brachial     Amilcar's Test N/A     pH, Arterial 7.452 (H) 7.350 - 7.450 pH units    pCO2, Arterial 32.1 (L) 35.0 - 45.0 mm Hg    pO2, Arterial 86.3 80.0 - 100.0 mm Hg    HCO3, Arterial 22.4 22.0 - 28.0 mmol/L    Base Excess, Arterial -0.9 (L) 0.0 - 2.0 mmol/L    O2 Saturation Calculated 97.1 92.0 - 99.0 %    A-a Gradiant 0.3 mmHg    Barometric Pressure for Blood Gas 754.1 mmHg    Modality Adult Vent     FIO2 40 %    Ventilator Mode AC     Set  Tidal Volume 500     Set [facility] Resp Rate 22     Rate 22 Breaths/minute    PEEP 7.5        Ordered the above labs and independently reviewed the results.        RADIOLOGY  Ct Head Without Contrast    Result Date: 8/8/2020  CT OF THE HEAD WITHOUT CONTRAST  HISTORY: Altered mental status  COMPARISON: 11/08/2017  TECHNIQUE: Axial CT imaging was obtained through the brain. No IV contrast was administered.  FINDINGS: No acute intracranial hemorrhage is seen. There is diffuse atrophy. There is periventricular and deep white matter microangiopathic change. There is no midline shift or mass effect. Mild mucosal thickening is seen within the ethmoid sinuses. There is mild partial opacification of the mastoid air cells. Similar findings were present on prior study.      No acute findings.  Radiation dose reduction techniques were utilized, including automated exposure control and exposure modulation based on body size.  This report was finalized on 8/8/2020 9:00 PM by Dr. Pooja Gee M.D.      Xr Chest 1 View    Result Date: 8/8/2020  PORTABLE CHEST RADIOGRAPH  HISTORY: Tube placement  COMPARISON: 08/08/2020  FINDINGS: Endotracheal tube terminates in satisfactory position. Cardiac silhouette is stable. Bilateral pulmonary opacities are seen. Appearance is concerning for multifocal pneumonia, it would be in keeping with patient's diagnosis of COVID 19. Gaseous distention of loops of bowel is noted within the upper abdomen. Lucent area within the right upper quadrant is suspected to be related to air within the hepatic flexure of the colon, but given the presence of gaseous distention of bowel, it could be further evaluated with CT of the abdomen and pelvis. No pneumothorax is seen. Small left pleural effusion is suspected.       1. Endotracheal tube terminates in satisfactory position. 2. Multifocal pulmonary opacities, in keeping with diagnosis of COVID 19. 3. Lucency seen within the right upper quadrant is suspected to  represent air within the hepatic flexure of the colon. There is gaseous distention of loops of bowel seen within the upper abdomen, and CT could be considered for additional evaluation.  This report was finalized on 8/8/2020 8:17 PM by Dr. Pooja Gee M.D.      Xr Chest 1 View    Result Date: 8/8/2020  ONE VIEW PORTABLE CHEST  HISTORY: Shortness of breath.  There is cardiomegaly with considerable interstitial prominence that is new since a study of 07/05/2018 and likely relates to changes of congestive heart failure. I cannot completely exclude the possibility of interstitial pneumonitis and clinical correlation is recommended.  This report was finalized on 8/8/2020 4:26 PM by Dr. Marty Daniels M.D.      Xr Abdomen Kub    Result Date: 8/8/2020  KUB  HISTORY: Feeding tube placement  COMPARISON: None available.  FINDINGS: Weighted enteric feeding tube appears to be coiled upon itself within the fundus of the stomach. Bilateral pulmonary opacities are seen. Extensive gaseous distention of loops of bowel is seen throughout the abdomen. Again, there is a lucency which is abutting the right hemidiaphragm. This is favored to be air within the hepatic flexure of the colon, but given the degree of distention, further evaluation with CT of the abdomen and pelvis is recommended. Extensive bilateral pulmonary opacities are again seen.       1. Weighted enteric feeding tube appears to be coiled within the stomach. 2. Marked gaseous distention of bowel seen throughout the abdomen. Further evaluation with CT is recommended.  This report was finalized on 8/8/2020 11:19 PM by Dr. Pooja Gee M.D.        I ordered the above noted radiological studies. Reviewed by me and discussed with radiologist.  See dictation for official radiology interpretation.      PROCEDURES    Intubation  Date/Time: 8/8/2020 11:42 PM  Performed by: Brody Almonte II, MD  Authorized by: Brody Almonte II, MD     Consent:     Consent  obtained:  Emergent situation    Consent given by:  Healthcare agent    Alternatives discussed:  No treatment  Pre-procedure details:     Patient status:  Unresponsive  Procedure details:     Preoxygenation:  Bag valve mask    CPR in progress: no      Intubation method:  Oral    Oral intubation technique:  Video-assisted    Laryngoscope size: s3.    Tube size (mm):  7.0    Tube type:  Cuffed    Number of attempts:  1    Ventilation between attempts: no      Cricoid pressure: no      Tube visualized through cords: yes    Placement assessment:     ETT to teeth:  21    Tube secured with:  ETT rushing    Breath sounds:  Equal    Placement verification: chest rise, CXR verification, equal breath sounds and ETCO2 detector      CXR findings:  ETT in proper place  Post-procedure details:     Patient tolerance of procedure:  Tolerated well, no immediate complications    Critical Care  Performed by: Brody Almonte II, MD  Authorized by: Brody Almonte II, MD     Critical care provider statement:     Critical care time (minutes):  40    Critical care was necessary to treat or prevent imminent or life-threatening deterioration of the following conditions:  Respiratory failure and metabolic crisis    Critical care was time spent personally by me on the following activities:  Ordering and performing treatments and interventions, ordering and review of laboratory studies, ordering and review of radiographic studies, pulse oximetry, re-evaluation of patient's condition, obtaining history from patient or surrogate, discussions with consultants, evaluation of patient's response to treatment, examination of patient and development of treatment plan with patient or surrogate          MEDICATIONS GIVEN IN ER    Medications   sodium chloride 0.9 % flush 10 mL (has no administration in time range)   propofol (DIPRIVAN) infusion 10 mg/mL 100 mL (5 mcg/kg/min × 57.7 kg Intravenous New Bag 8/8/20 7113)   aspirin chewable tablet 81  mg (has no administration in time range)   bacitracin 500 UNIT/GM ointment (has no administration in time range)   carvedilol (COREG) tablet 25 mg (has no administration in time range)   nystatin (MYCOSTATIN) powder (has no administration in time range)   levothyroxine (SYNTHROID, LEVOTHROID) tablet 88 mcg (has no administration in time range)   dextrose (GLUTOSE) oral gel 15 g (has no administration in time range)   dextrose (D50W) 25 g/ 50mL Intravenous Solution 25 g (has no administration in time range)   glucagon (human recombinant) (GLUCAGEN DIAGNOSTIC) injection 1 mg (has no administration in time range)   sodium chloride 0.9 % flush 10 mL (has no administration in time range)   sodium chloride 0.9 % flush 10 mL (has no administration in time range)   heparin (porcine) 5000 UNIT/ML injection 5,000 Units (5,000 Units Subcutaneous Given 8/8/20 2154)   dextrose 5 % and sodium chloride 0.45 % infusion (75 mL/hr Intravenous New Bag 8/8/20 2150)   acetaminophen (TYLENOL) tablet 650 mg (has no administration in time range)     Or   acetaminophen (TYLENOL) suppository 650 mg (has no administration in time range)   acetaminophen (TYLENOL) tablet 650 mg (has no administration in time range)     Or   acetaminophen (TYLENOL) 160 MG/5ML solution 650 mg (has no administration in time range)     Or   acetaminophen (TYLENOL) suppository 650 mg (has no administration in time range)   potassium chloride (MICRO-K) CR capsule 40 mEq (has no administration in time range)     Or   potassium chloride (KLOR-CON) packet 40 mEq (has no administration in time range)     Or   potassium chloride 10 mEq in 100 mL IVPB (has no administration in time range)   Magnesium Sulfate 2 gram Bolus, followed by 8 gram infusion (total Mg dose 10 grams)- Mg less than or equal to 1mg/dL (has no administration in time range)     Or   Magnesium Sulfate 2 gram / 50mL Infusion (GIVE X 3 BAGS TO EQUAL 6GM TOTAL DOSE) - Mg 1.1 - 1.5 mg/dl (has no  administration in time range)     Or   Magnesium Sulfate 4 gram infusion- Mg 1.6-1.9 mg/dL (has no administration in time range)   insulin lispro (humaLOG) injection 0-9 Units (has no administration in time range)   hydrALAZINE (APRESOLINE) injection 10 mg (has no administration in time range)   docusate sodium (COLACE) liquid 100 mg (has no administration in time range)   bisacodyl (DULCOLAX) suppository 10 mg (10 mg Rectal Not Given 8/8/20 2154)   ondansetron (ZOFRAN) tablet 4 mg (has no administration in time range)     Or   ondansetron (ZOFRAN) injection 4 mg (has no administration in time range)   famotidine (PEPCID) tablet 20 mg (has no administration in time range)   dexamethasone (DECADRON) injection 6 mg (has no administration in time range)   naloxone (NARCAN) injection 0.1 mg (has no administration in time range)   fentaNYL citrate (PF) (SUBLIMAZE) injection 25 mcg (25 mcg Intravenous Given 8/8/20 2150)   lactated ringers bolus 1,000 mL (0 mL Intravenous Stopped 8/8/20 1904)   fentaNYL citrate (PF) (SUBLIMAZE) injection 50 mcg (50 mcg Intravenous Given 8/8/20 1913)   etomidate (AMIDATE) injection 17.32 mg (30 mg Intravenous Given 8/8/20 1855)   rocuronium (ZEMURON) injection 1 mg/kg (70 mg Intravenous Given 8/8/20 1854)   sodium chloride 0.9 % bolus 1,000 mL (0 mL Intravenous Stopped 8/8/20 1944)   sodium chloride 0.9 % bolus 1,000 mL (1,000 mL Intravenous New Bag 8/8/20 1952)         PROGRESS, DATA ANALYSIS, CONSULTS, AND MEDICAL DECISION MAKING    All labs have been independently reviewed by me.  All radiology studies have been reviewed by me and discussed with radiologist dictating the report.   EKG's independently viewed and interpreted by me.  Discussion below represents my analysis of pertinent findings related to patient's condition, differential diagnosis, treatment plan and final disposition.    Patient presents with altered mental status.  She appears to be clinically very dry and her sodium  confirms this.  She is also known to have COVID-19.  I am working to check on her CODE STATUS.      ED Course as of Aug 08 2340   Sat Aug 08, 2020   1625 WBC: 9.17 [TD]   1625 Hemoglobin: 14.2 [TD]   1625 EKG interpreted by myself.  Time 1518.  Sinus rhythm.  Heart rate 129.  Normal axis.  Left bundle branch block.  Negative Sgarbossa criteria.On medical chart review, old EKG obtained from 3/23/2016.  Sinus rhythm.  Rate 85.  The frontal branch block.  Heart rate is slower on this old EKG compared to today's EKG.    [TD]   1626 Chest x-ray interpreted by myself.  There is bilateral interstitial prominence.    [TD]   1645 proBNP: 1,582.0 [TD]   1645 Troponin T: 0.020 [TD]   1645 INR(!): 1.18 [TD]   1654 Sodium(!!): 163 [TD]   1654 Creatinine: 0.99 [TD]   1714 Discussed the case with the patient's niece and POA.  Niece felt very strongly that the patient would want to be intubated and would want IV fluids.  However, should the patient deteriorate, they are aware that the patient may ultimately die from her condition.  However she feels very strongly that the patient does want to pursue mechanical ventilation.    [TD]   1909 I discussed the case with Dr. Torres, pulmonology/ICU.  He will admit.  We reviewed the patient's labs and imaging.    [TD]   1958 Repeat chest x-ray postintubation shows distal end of the endotracheal tube in between the clavicular heads.    [TD]      ED Course User Index  [TD] Brody Almonte II, MD           PPE: Both the patient and I wore a surgical mask throughout the entire patient encounter. I wore protective goggles.     AS OF 23:40 VITALS:    BP - 117/55  HR - 65  TEMP - 99 °F (37.2 °C) (Tympanic)  O2 SATS - 100%        DIAGNOSIS  Final diagnoses:   Acute hypoxemic respiratory failure (CMS/HCC)   COVID-19 virus infection   Hypernatremia         DISPOSITION  Admit           Brody Almonte II, MD  08/08/20 2704

## 2020-08-08 NOTE — ED NOTES
Left message with [facility] Osborn to determine pt normal LOC      Nga Oscar, RY  08/08/20 7317

## 2020-08-09 NOTE — PLAN OF CARE
Problem: Ventilation, Mechanical Invasive (Adult)  Intervention: Monitor/Manage Nutrition Support  Flowsheets (Taken 8/9/2020 1104)  Nutrition Support Management: tube feeding initiated; other (see comments)     NG in place  Diabetisource AC @ goal rate 45 cc/hr  Water flush 50 cc Q 2 hr

## 2020-08-09 NOTE — PLAN OF CARE
Patient remains in CCU on vent. Sedation off all day. Patient arouses to physical stimuli but not following commands. Per family, patient is very hard of hearing and may not understand what nursing staff is asking her to do. SBT attempted this afternoon but patient failed d/t hypoventilation and low tidal volume. Continued bradycardia with intermittent low blood pressure. Poor urine output noted this shift. IVF and water flushes continue. Continuing to monitor.

## 2020-08-09 NOTE — CONSULTS
Referring Provider: Pawan Torres MD      Subjective   History of present illness:    Patient is intubated and unable to get any history from her.  Discussed with nursing as well as reviewed the admitting notes.  She was brought to the ER yesterday with altered mental status and once in the ER had respiratory failure.  She was hypoxic on high flow nasal cannula and required intubation.  Once intubated, however, she was able to be weaned and oxygen went down to 40% FiO2 with a PEEP of 7.5.  She has been further reduced now to an FiO2 of 30%.  It appears that she was recently diagnosed with COVID pneumonia and required admission to [facility] from 7/29 through August 6, 2020.  She was then discharged to a rehab unit.  At baseline she is institutionalized for Alzheimer's but ambulates.  Past medical history: Coronary artery disease, dementia/Alzheimer's, diabetes mellitus type 2 with neuropathy, endometrial cancer status post hysterectomy, hyper lipidemia, hypertension, hypothyroidism, osteoarthritis, PVD, urge incontinence, back surgery, breast biopsy/lumpectomy, PCI, near-total thyroidectomy, COVID-19  Family history notable for breast cancer in her mother and COPD/CAD in her father.  Social history: She normally lives in a Alzheimer's unit in [place], [place].  Non-smoker        Allergies   Allergen Reactions   • Ciprofloxacin    • Diazepam    • Hydrocodone    • Hydrocodone-Acetaminophen    • Lidocaine    • Penicillins    • Statins        Medication:  Antibiotics:  Anti-Infectives (From admission, onward)    None          Review of Systems  Review of systems could not be obtained due to   patient nonverbal.    Objective     Physical Exam:   Vital Signs   Temp:  [98 °F (36.7 °C)-99 °F (37.2 °C)] 98 °F (36.7 °C)  Heart Rate:  [] 56  Resp:  [16-24] 22  BP: ()/() 111/62  FiO2 (%):  [30 %-100 %] 30 %    GENERAL: She looks like she is not having any distress on the ventilator  HEENT:  Oropharynx is clear with ET tube in place. Hearing is unable to assess  EYES: PERRL. No conjunctival injection. No lid lag.   LYMPHATICS: No lymphadenopathy of the neck or inguinal regions.   HEART: Regular rate and regular rhythm. No peripheral edema.   LUNGS: Clear to auscultation anteriorly with normal respiratory effort.   GI: Soft, nontender, nondistended. No appreciable organomegaly.   SKIN: Warm and dry without cutaneous eruptions   PSYCHIATRIC: She is not following any commands for me.  She is off sedation    Results Review:  Creatinine 1.26  CRP 10.3   D-dimer 2.42  White count 7.86  Hemoglobin 12  Platelets 162  Sodium 158  Glucose 299  Procalcitonin was 0.78  Microbiology: None      Radiology:  CT head no acute findings  KUB shows feeding tube in stomach with gaseous distention throughout the bowel  Chest x-ray dependently interpreted shows multifocal pulmonary opacities  Assessment/Plan   1.  Acute hypoxic respiratory failure  2.  Acute encephalopathy  3.  Hypernatremia  4.  COVID-19 pneumonia    She has already been hospitalized for the COVID-19 pneumonia and is unclear to me if her demise is related to progressive COVID-19 or there is something else going on with her.  We are going to try to get the records from her hospitalization from [facility] so we can get more details as to what was performed.  For now we will keep her on the steroids and repeat her labs in the morning.  Her oxygen requirement is not high which is encouraging.    Remdesivir actually has not been shown to provide any benefit in intubated patient.         Thank you for this consult.  We will continue to follow along and tailor antibiotics as the patient's clinical course evolves.      Bijan Shafer MD  08/09/20  12:39 PM

## 2020-08-09 NOTE — PLAN OF CARE
Problem: Patient Care Overview  Goal: Plan of Care Review  Outcome: Ongoing (interventions implemented as appropriate)  Flowsheets  Taken 8/9/2020 7543  Plan of Care Reviewed With: patient  Taken 8/9/2020 3176  Outcome Summary: Pt admitted to CCU overnight for COVID/pneumonia. On ventilator, FiO2 at 40%. Abdominal distention decreased throughout shift. Tube feeds started, tolerating well. On low dose propofol for sedation. Deep tissue injury on right heel. Unable to obtain information from patient for admission database. Ruthann Massey RN.

## 2020-08-09 NOTE — PROGRESS NOTES
[facility]     Mar/chart reviewed  Follow up acute hypoxemic respiratory failure, COVID19 pneumonia  Patient intubated and sedated unable to provide subjective    Vital Sign Min/Max for last 24 hours  Temp  Min: 98.9 °F (37.2 °C)  Max: 99 °F (37.2 °C)   BP  Min: 81/52  Max: 162/112   Pulse  Min: 47  Max: 126   Resp  Min: 16  Max: 24   SpO2  Min: 74 %  Max: 100 %   Flow (L/min)  Min: 4  Max: 15   Weight  Min: 56.3 kg (124 lb 1.9 oz)  Max: 57.7 kg (127 lb 4.8 oz)   2676/0  Appears ill, sedated on vent  perrl, normal sclera, no palpable thyroid nodules  mmm, no jvd, trachea midline, neck supple,  chest fair ae bilaterally, + crackles, no wheezes,   rrr,   soft, nt, nd +bs,  no c/c/ e  Skin warm, dry no rashes, decreased turgor    Labs: 8/9: reviewed:   Glucose 233  Bun 51  Cr 1.26  Na 158  Bicarb 23  Wbc 7.86 (89% neutorphils)  hgb 12.2  plts 162    Radiology: reviewed prior, nothing new today    A/P:  1. Acute hypoxemic and hypercapnic respiratory failure -- she is actually currently on minimal oxygen support may start spontaneous trials    2. COVID19 pneumonia -- given she was diagnosed on 7/29 I have to consider bacterial supra infection or PE as possible contributions to her recent decline.  Will check infectious studies, procal and d-dimer.  I don't think giving her contrast right now is not  best so if d-dimer is elevated will check venous dopplers and consider increasing dose of anticoagulation emperically.    3. Dehydration with hypernatremia -- na is decreasing, continue iv fluids, begin tube feeding  4. DMII with hyperglycemia-- accuchecks are high, she is on d5 and steroids, will add some lantus.   5. Ileus with dilated large bowel, history of  Multiple abodminal surgeries -- monitor stooling and abdominal exam  6. PVD  7. Dementia  8. Breast cancer in remission    Patient is new to me today    CC 60 mins.

## 2020-08-09 NOTE — CONSULTS
"Adult Nutrition  Assessment/PES    Patient Name:  Irina Brown  YOB: 1928  MRN: [id number]  Admit Date:  8/8/2020    Assessment Date:  8/9/2020    Comments:  Consult for TF assessment    Pt is COVID (+), on vent. Diabetisource AC has been started via NG tube currently at 25 cc/hr. Ordered goal rate 45 cc/hr - provision: 1296 kcal (76%), 65 gm pro (97%), 855 cc free water + 600 cc in flushes. Pt has been receiving propofol, currently on hold; calories from the propofol will determine if any increase in rate warranted. No changes made at present. RD to follow closely, adjust TF as needed.     RD working remotely due to covid-19 pandemic. Assessment completed based on review of electronic medical record. RD may be reached via secure chat or email.       Reason for Assessment     Row Name 08/09/20 1048          Reason for Assessment    Reason For Assessment  TF/PN;physician consult     Diagnosis  diabetes diagnosis/complications;infection/sepsis;pulmonary disease     Identified At Risk by Screening Criteria  large or nonhealing wound, burn or pressure injury;tube feeding or parenteral nutrition           Anthropometrics     Row Name 08/09/20 1049          Anthropometrics    Height Method  estimated     Height  175.3 cm (69.02\")        Admit Weight    Admit Weight  57.6 kg (127 lb)        Ideal Body Weight (IBW)    Ideal Body Weight (IBW) (kg)  66.47     % of Ideal Body Weight Assessment  80-90%: mild deficit 87%        Usual Body Weight (UBW)    Usual Body Weight  68 kg (150 lb) 2018     Weight Loss Time Frame  UTD         Labs/Tests/Procedures/Meds     Row Name 08/09/20 1051          Labs/Procedures/Meds    Lab Results Reviewed  reviewed, pertinent     Lab Results Comments  Na 158, CL, GLu 200-300s, BUN/Cr, CRP, wbc, h/h        Diagnostic Tests/Procedures    Diagnostic Test/Procedure Reviewed  reviewed, pertinent     Diagnostic Test/Procedures Comments  KUB, CT head, cxr        Medications    " "Pertinent Medications Reviewed  reviewed, pertinent     Pertinent Medications Comments  steroid, pepcid, heparin, insulin, ppf         Physical Findings     Row Name 08/09/20 1052          Physical Findings    Overall Physical Appearance  on ventilator support     Gastrointestinal  feeding tube     Tubes  nasogastric tube     Skin  other (see comments);pressure injury R heel pressure injury         Estimated/Assessed Needs     Row Name 08/09/20 1053 08/09/20 1049       Calculation Measurements    Weight Used For Calculations  56.3 kg (124 lb 1.9 oz)  --    Height  --  175.3 cm (69.02\")       Estimated/Assessed Needs    Additional Documentation  Fluid Requirements (Group);KCAL/KG (Group);Winston-St. Jeor Equation (Group);Protein Requirements (Group)  --       KCAL/KG    KCAL/KG  30 Kcal/Kg (kcal)  --    30 Kcal/Kg (kcal)  1689  --       Winston-St. Jeor Equation    RMR (Winston-St. Jeor Equation)  1042.629  --    Winston-St. Jeor Activity Factors  1.4 - 1.5  --    Activity Factors (Winston-St. Jeor)  1459.7106 - 1563.7122  --       Protein Requirements    Weight Used For Protein Calculations  56.3 kg (124 lb 1.9 oz)  --    Est Protein Requirement Amount (gms/kg)  1.5 gm protein 67-84 gm (1.2-1.5 g/kg)  --    Estimated Protein Requirements (gms/day)  84.45  --       Fluid Requirements    Estimated Fluid Requirements (mL/day)  1500  --    Estimated Fluid Requirement Method  RDA Method  --    RDA Method (mL)  1500  --        Nutrition Prescription Ordered     Row Name 08/09/20 1054          Nutrition Prescription PO    Current PO Diet  NPO        Nutrition Prescription EN    Enteral Route  NG     Product  Diabetisource AC (Glucerna 1.2)     TF Delivery Method  Continuous     Continuous TF Goal Rate (mL/hr)  45 mL/hr     Continuous TF Current Rate (mL/hr)  25 mL/hr     Continuous TF Goal Volume (mL)  1080 mL     Water flush (mL)   50 mL     Water Flush Frequency  Every 2 hours        Propofol Considerations    Propofol " "(mL/hr)  0 mL/hr RN noting \"sedation vacation\" at present. Unsure of past infusion rate/volume         Evaluation of Received Nutrient/Fluid Intake     Row Name 08/09/20 1056          Calories Evaluation    Enteral Calories (kcal)  1296     % of Kcal Needs  76        Protein Evaluation    Enteral Protein (gm)  65     % of Protein Needs  97        Intake Assessment    Energy/Calorie Requirement Assessment  not meeting needs     Protein Requirement Assessment  meeting needs        Fluid Intake Evaluation    Enteral (Free Water) Fluid (mL)  855     Free Water Flush Fluid (mL)  600     Total Free Water Intake (mL)  1455 mL               Problem/Interventions:  Problem 1     Row Name 08/09/20 1058          Nutrition Diagnoses Problem 1    Problem 1  Needs Alternate Route     Etiology (related to)  Medical Diagnosis     Infectious Disease  Other (comment) COVID 19     Pulmonary/Critical Care  Acute respiratory failure;Ventilator     Signs/Symptoms (evidenced by)  NPO               Intervention Goal     Row Name 08/09/20 1059          Intervention Goal    General  Maintain nutrition;Nutrition support treatment;Disease management/therapy;Meet nutritional needs for age/condition     TF/PN  Maintain TF/PN;Deliver estimated need (%)     Deliver % of Estimated Need  100 %     Weight  No significant weight loss         Nutrition Intervention     Row Name 08/09/20 1059          Nutrition Intervention    RD/Tech Action  Follow Tx progress;Care plan reviewd         Nutrition Prescription     Row Name 08/09/20 1059          Nutrition Prescription EN    Enteral Prescription  Continue same protocol         Education/Evaluation     Row Name 08/09/20 1059          Education    Education  Education not appropriate at this time;Will Instruct as appropriate     Please explain  Patient intubated        Monitor/Evaluation    Monitor  Per protocol           Electronically signed by:  Mikala Canela RD  08/09/20 11:00  "

## 2020-08-10 NOTE — CONSULTS
Referring Provider: Gerardo Godoy MD  Reason for Consultation: Evaluation patient with acute kidney injury and hyponatremia    Subjective     Chief complaint   Chief Complaint   Patient presents with   • Shortness of Breath       History of present illness:    This is a 89-year-old  female was admitted on 8/8/2020 she is COVID 19+, she had acute respiratory failure currently on the ventilator she is currently on 30% FiO2 noted to have increased sodium hence nephrology consult was requested also increased creatinine.  The patient has been in his institutionalized for Alzheimer's.  Past medical history includes coronary artery disease, diabetes mellitus type 2 with neuropathy, history of endometrial cancer with previous hysterectomy, dyslipidemia, hypertension, hypothyroidism, degenerative joint disease, peripheral vascular disease, urinary incontinence and prior back surgery she had a prior PCI and stents, she had near total thyroidectomy.  No history could be obtained from the patient all the information were obtained from the chart.  Past Medical History:   Diagnosis Date   • Atherosclerotic coronary vascular disease    • Breast cancer (CMS/HCC)    • Breast screening    • Chest pain    • Dementia    • Diabetic peripheral neuropathy (CMS/HCC)    • Diverticulosis    • Endometrial cancer (CMS/HCC)    • Esophageal reflux    • H/O Bell's palsy    • H/O electrocardiogram     shows normal sinus rhythm, prematuer ventricular complex.  Otherwise normal appearing ECG   • Health care maintenance    • Hyperlipidemia    • Hypertension    • Hypothyroidism    • Inconclusive mammogram    • LBBB (left bundle branch block)    • Numbness in both legs    • Olecranon bursitis    • Osteoarthritis, hip, bilateral     pain   • Palpitations    • Pilonidal cyst     removed 1950   • Premature ventricular contractions    • PVD (peripheral vascular disease) (CMS/HCC)    • Right hip pain    • Thyroid adenoma    • Type 2 diabetes  mellitus (CMS/HCC)    • Urge incontinence    • Uterine cancer (CMS/HCC)    • UTI (urinary tract infection)    • Venous insufficiency of lower extremity    • Warthin's tumor      Past Surgical History:   Procedure Laterality Date   • BACK SURGERY      cyst removal   • BREAST BIOPSY     • BREAST LUMPECTOMY Left     1956 & 1958   • CORONARY STENT PLACEMENT      previous   • HIP SURGERY      cyst removal   • HYSTERECTOMY      endometrial CA   • THYROIDECTOMY      near-total     Family History   Problem Relation Age of Onset   • Breast cancer Mother    • Diabetes Mother    • Hypertension Mother    • Emphysema Father    • Hypertension Father    • Heart attack Father        Social History     Tobacco Use   • Smoking status: Never Smoker   Substance Use Topics   • Alcohol use: No   • Drug use: No     Medications Prior to Admission   Medication Sig Dispense Refill Last Dose   • aspirin 81 MG tablet Take 81 mg by mouth daily.   Taking   • bacitracin 500 UNIT/GM ointment Apply  topically 2 (two) times a day. 14 g 0 Taking   • carvedilol (COREG) 25 MG tablet TAKE ONE TABLET BY MOUTH TWICE A DAY 60 tablet 4 Taking   • clindamycin (CLEOCIN) 300 MG capsule Take 1 capsule by mouth 4 (Four) Times a Day. 28 capsule 0    • econazole nitrate (SPECTAZOLE) 1 % cream    Taking   • metFORMIN (GLUCOPHAGE) 500 MG tablet TAKE TWO TABLETS BY MOUTH TWICE A  tablet 0    • metroNIDAZOLE (FLAGYL) 250 MG tablet Take 1 tablet by mouth 3 (Three) Times a Day. 21 tablet 0    • nystatin (MYCOSTATIN) 343743 UNIT/GM powder Apply  topically 3 (Three) Times a Day. 60 g 0    • repaglinide (PRANDIN) 0.5 MG tablet TAKE ONE TABLET BY MOUTH BEFORE MEALS (THREE TIMES A DAY) AS DIRECTED 270 tablet 2 Taking   • SYNTHROID 88 MCG tablet TAKE ONE TABLET BY MOUTH DAILY 30 tablet 4 Taking   • vitamin B-12 (CYANOCOBALAMIN) 100 MCG tablet Take 1 tablet by mouth daily.   Taking   • vitamin B-6 (PYRIDOXINE) 100 MG tablet Take 1 tablet by mouth daily.   Taking   •  "ZESTORETIC 20-25 MG per tablet TAKE 1/2 TABLET BY MOUTH DAILY 15 tablet 3      Allergies:  Ciprofloxacin; Diazepam; Hydrocodone; Hydrocodone-acetaminophen; Lidocaine; Penicillins; and Statins    Review of Systems  Review of system not obtainable    Objective     Vital Signs  Temp:  [97.2 °F (36.2 °C)-98.2 °F (36.8 °C)] 98.1 °F (36.7 °C)  Heart Rate:  [44-64] 51  Resp:  [12-24] 12  BP: ()/(41-77) 142/66  FiO2 (%):  [30 %] 30 %    Flowsheet Rows      First Filed Value   Admission Height  175.3 cm (69.02\") Documented at 08/09/2020 1049   Admission Weight  57.7 kg (127 lb 4.8 oz) Documented at 08/08/2020 1715           No intake/output data recorded.  I/O last 3 completed shifts:  In: 5158.6 [I.V.:1703.6; Other:711; NG/GT:744; IV Piggyback:2000]  Out: 125 [Urine:125]    Intake/Output Summary (Last 24 hours) at 8/10/2020 1218  Last data filed at 8/10/2020 0515  Gross per 24 hour   Intake 1876 ml   Output 125 ml   Net 1751 ml       Physical Exam:  The physical exam was limited because of the COVID-19 crisis and limitation on the PPE's.  I reviewed the other physicians physical exam and use that.  The patient is on the ventilator, orally intubated she had a feeding tube, she is sedated.  Does not appear to be in any acute distress.  She had dependent edema in the hip and thigh area.  Skin is warm and dry  Lungs: Bilateral rhonchi breathing effort.  Heart: Regular rate and rhythm, no rub prescription  Extremities: No ankle edema that she has 1+ hip edema  Neuro: Difficult to assess      Results Review:  Results for orders placed or performed during the hospital encounter of 08/08/20   Respiratory Culture - Sputum, Cough   Result Value Ref Range    Respiratory Culture       Light growth (2+) Normal Respiratory Patience: NO S.aureus/MRSA or Pseudomonas aeruginosa    Gram Stain Few (2+) Budding yeast     Gram Stain       Moderate (3+) Mixed bacterial morphotypes seen on Gram Stain    Gram Stain Few (2+) Epithelial cells per " low power field     Gram Stain Moderate (3+) WBCs per low power field    S. Pneumo Ag Urine or CSF - Urine, Urine, Clean Catch   Result Value Ref Range    Strep Pneumo Ag Negative Negative   Legionella Antigen, Urine - Urine, Urine, Clean Catch   Result Value Ref Range    LEGIONELLA ANTIGEN, URINE Negative Negative   Comprehensive Metabolic Panel   Result Value Ref Range    Glucose 169 (H) 65 - 99 mg/dL    BUN 45 (H) 8 - 23 mg/dL    Creatinine 0.99 0.57 - 1.00 mg/dL    Sodium 163 (C) 136 - 145 mmol/L    Potassium 4.0 3.5 - 5.2 mmol/L    Chloride 123 (H) 98 - 107 mmol/L    CO2 26.0 22.0 - 29.0 mmol/L    Calcium 9.6 8.2 - 9.6 mg/dL    Total Protein 6.8 6.0 - 8.5 g/dL    Albumin 3.40 (L) 3.50 - 5.20 g/dL    ALT (SGPT) 18 1 - 33 U/L    AST (SGOT) 16 1 - 32 U/L    Alkaline Phosphatase 54 39 - 117 U/L    Total Bilirubin 0.3 0.0 - 1.2 mg/dL    eGFR Non African Amer 53 (L) >60 mL/min/1.73    Globulin 3.4 gm/dL    A/G Ratio 1.0 g/dL    BUN/Creatinine Ratio 45.5 (H) 7.0 - 25.0    Anion Gap 14.0 5.0 - 15.0 mmol/L   BNP   Result Value Ref Range    proBNP 1,582.0 0.0-1,800.0 pg/mL   Troponin   Result Value Ref Range    Troponin T 0.020 0.000 - 0.030 ng/mL   Protime-INR   Result Value Ref Range    Protime 14.9 (H) 11.7 - 14.2 Seconds    INR 1.18 (H) 0.90 - 1.10   CBC Auto Differential   Result Value Ref Range    WBC 9.17 3.40 - 10.80 10*3/mm3    RBC 4.55 3.77 - 5.28 10*6/mm3    Hemoglobin 14.2 12.0 - 15.9 g/dL    Hematocrit 44.7 34.0 - 46.6 %    MCV 98.2 (H) 79.0 - 97.0 fL    MCH 31.2 26.6 - 33.0 pg    MCHC 31.8 31.5 - 35.7 g/dL    RDW 13.7 12.3 - 15.4 %    RDW-SD 50.6 37.0 - 54.0 fl    MPV 11.4 6.0 - 12.0 fL    Platelets 195 140 - 450 10*3/mm3    Neutrophil % 83.6 (H) 42.7 - 76.0 %    Lymphocyte % 11.5 (L) 19.6 - 45.3 %    Monocyte % 4.5 (L) 5.0 - 12.0 %    Eosinophil % 0.0 (L) 0.3 - 6.2 %    Basophil % 0.1 0.0 - 1.5 %    Immature Grans % 0.3 0.0 - 0.5 %    Neutrophils, Absolute 7.67 (H) 1.70 - 7.00 10*3/mm3    Lymphocytes,  Absolute 1.05 0.70 - 3.10 10*3/mm3    Monocytes, Absolute 0.41 0.10 - 0.90 10*3/mm3    Eosinophils, Absolute 0.00 0.00 - 0.40 10*3/mm3    Basophils, Absolute 0.01 0.00 - 0.20 10*3/mm3    Immature Grans, Absolute 0.03 0.00 - 0.05 10*3/mm3    nRBC 0.0 0.0 - 0.2 /100 WBC   Lactic Acid, Plasma   Result Value Ref Range    Lactate 3.6 (C) 0.5 - 2.0 mmol/L   Blood Gas, Arterial   Result Value Ref Range    Site Arterial: left brachial     Amilcar's Test N/A     pH, Arterial 7.156 (C) 7.350 - 7.450 pH units    pCO2, Arterial 75.9 (C) 35.0 - 45.0 mm Hg    pO2, Arterial 349.3 (H) 80.0 - 100.0 mm Hg    HCO3, Arterial 26.8 22.0 - 28.0 mmol/L    Base Excess, Arterial -3.2 (L) 0.0 - 2.0 mmol/L    O2 Saturation Calculated 99.9 (H) 92.0 - 99.0 %    A-a Gradiant 0.5 mmHg    Barometric Pressure for Blood Gas 756.2 mmHg    Modality Adult Vent     FIO2 100 %    Ventilator Mode VC     Set Tidal Volume 500     Set Mech Resp Rate 16     Rate 16 Breaths/minute    PEEP 7.5    Basic Metabolic Panel   Result Value Ref Range    Glucose 209 (H) 65 - 99 mg/dL    BUN 47 (H) 8 - 23 mg/dL    Creatinine 1.19 (H) 0.57 - 1.00 mg/dL    Sodium 159 (H) 136 - 145 mmol/L    Potassium 4.2 3.5 - 5.2 mmol/L    Chloride 122 (H) 98 - 107 mmol/L    CO2 21.4 (L) 22.0 - 29.0 mmol/L    Calcium 8.5 8.2 - 9.6 mg/dL    eGFR Non African Amer 43 (L) >60 mL/min/1.73    BUN/Creatinine Ratio 39.5 (H) 7.0 - 25.0    Anion Gap 15.6 (H) 5.0 - 15.0 mmol/L   CBC Auto Differential   Result Value Ref Range    WBC 9.90 3.40 - 10.80 10*3/mm3    RBC 3.82 3.77 - 5.28 10*6/mm3    Hemoglobin 11.8 (L) 12.0 - 15.9 g/dL    Hematocrit 38.4 34.0 - 46.6 %    .5 (H) 79.0 - 97.0 fL    MCH 30.9 26.6 - 33.0 pg    MCHC 30.7 (L) 31.5 - 35.7 g/dL    RDW 13.3 12.3 - 15.4 %    RDW-SD 50.2 37.0 - 54.0 fl    MPV 11.6 6.0 - 12.0 fL    Platelets 155 140 - 450 10*3/mm3    Neutrophil % 91.9 (H) 42.7 - 76.0 %    Lymphocyte % 3.7 (L) 19.6 - 45.3 %    Monocyte % 3.5 (L) 5.0 - 12.0 %    Eosinophil % 0.0 (L)  0.3 - 6.2 %    Basophil % 0.1 0.0 - 1.5 %    Immature Grans % 0.8 (H) 0.0 - 0.5 %    Neutrophils, Absolute 9.09 (H) 1.70 - 7.00 10*3/mm3    Lymphocytes, Absolute 0.37 (L) 0.70 - 3.10 10*3/mm3    Monocytes, Absolute 0.35 0.10 - 0.90 10*3/mm3    Eosinophils, Absolute 0.00 0.00 - 0.40 10*3/mm3    Basophils, Absolute 0.01 0.00 - 0.20 10*3/mm3    Immature Grans, Absolute 0.08 (H) 0.00 - 0.05 10*3/mm3    nRBC 0.0 0.0 - 0.2 /100 WBC   Lactic Acid, Reflex Timer (This will reflex a repeat order 3-3:15 hours after ordered.)   Result Value Ref Range    Hold Tube Hold for add-ons.    Blood Gas, Arterial   Result Value Ref Range    Site Arterial: right brachial     Amilcar's Test N/A     pH, Arterial 7.452 (H) 7.350 - 7.450 pH units    pCO2, Arterial 32.1 (L) 35.0 - 45.0 mm Hg    pO2, Arterial 86.3 80.0 - 100.0 mm Hg    HCO3, Arterial 22.4 22.0 - 28.0 mmol/L    Base Excess, Arterial -0.9 (L) 0.0 - 2.0 mmol/L    O2 Saturation Calculated 97.1 92.0 - 99.0 %    A-a Gradiant 0.3 mmHg    Barometric Pressure for Blood Gas 754.1 mmHg    Modality Adult Vent     FIO2 40 %    Ventilator Mode AC     Set Tidal Volume 500     Set Mech Resp Rate 22     Rate 22 Breaths/minute    PEEP 7.5    Basic Metabolic Panel   Result Value Ref Range    Glucose 233 (H) 65 - 99 mg/dL    BUN 51 (H) 8 - 23 mg/dL    Creatinine 1.26 (H) 0.57 - 1.00 mg/dL    Sodium 158 (H) 136 - 145 mmol/L    Potassium 3.7 3.5 - 5.2 mmol/L    Chloride 123 (H) 98 - 107 mmol/L    CO2 23.4 22.0 - 29.0 mmol/L    Calcium 8.5 8.2 - 9.6 mg/dL    eGFR Non African Amer 40 (L) >60 mL/min/1.73    BUN/Creatinine Ratio 40.5 (H) 7.0 - 25.0    Anion Gap 11.6 5.0 - 15.0 mmol/L   CBC Auto Differential   Result Value Ref Range    WBC 7.86 3.40 - 10.80 10*3/mm3    RBC 3.93 3.77 - 5.28 10*6/mm3    Hemoglobin 12.2 12.0 - 15.9 g/dL    Hematocrit 36.9 34.0 - 46.6 %    MCV 93.9 79.0 - 97.0 fL    MCH 31.0 26.6 - 33.0 pg    MCHC 33.1 31.5 - 35.7 g/dL    RDW 13.3 12.3 - 15.4 %    RDW-SD 46.0 37.0 - 54.0 fl     MPV 11.7 6.0 - 12.0 fL    Platelets 162 140 - 450 10*3/mm3    Neutrophil % 89.6 (H) 42.7 - 76.0 %    Lymphocyte % 5.6 (L) 19.6 - 45.3 %    Monocyte % 3.9 (L) 5.0 - 12.0 %    Eosinophil % 0.0 (L) 0.3 - 6.2 %    Basophil % 0.1 0.0 - 1.5 %    Immature Grans % 0.8 (H) 0.0 - 0.5 %    Neutrophils, Absolute 7.04 (H) 1.70 - 7.00 10*3/mm3    Lymphocytes, Absolute 0.44 (L) 0.70 - 3.10 10*3/mm3    Monocytes, Absolute 0.31 0.10 - 0.90 10*3/mm3    Eosinophils, Absolute 0.00 0.00 - 0.40 10*3/mm3    Basophils, Absolute 0.01 0.00 - 0.20 10*3/mm3    Immature Grans, Absolute 0.06 (H) 0.00 - 0.05 10*3/mm3    nRBC 0.0 0.0 - 0.2 /100 WBC   Lactic Acid, Reflex   Result Value Ref Range    Lactate 3.0 (C) 0.5 - 2.0 mmol/L   D-dimer, Quantitative   Result Value Ref Range    D-Dimer, Quantitative 2.42 (H) 0.00 - 0.49 MCGFEU/mL   Ferritin   Result Value Ref Range    Ferritin 817.00 (H) 13.00 - 150.00 ng/mL   Lactate Dehydrogenase   Result Value Ref Range     (H) 135 - 214 U/L   C-reactive Protein   Result Value Ref Range    C-Reactive Protein 10.32 (H) 0.00 - 0.50 mg/dL   Procalcitonin   Result Value Ref Range    Procalcitonin 0.78 (H) 0.00 - 0.25 ng/mL   Basic Metabolic Panel   Result Value Ref Range    Glucose 203 (H) 65 - 99 mg/dL    BUN 73 (H) 8 - 23 mg/dL    Creatinine 1.66 (H) 0.57 - 1.00 mg/dL    Sodium 153 (H) 136 - 145 mmol/L    Potassium 2.7 (L) 3.5 - 5.2 mmol/L    Chloride 121 (H) 98 - 107 mmol/L    CO2 20.9 (L) 22.0 - 29.0 mmol/L    Calcium 8.8 8.2 - 9.6 mg/dL    eGFR Non African Amer 29 (L) >60 mL/min/1.73    BUN/Creatinine Ratio 44.0 (H) 7.0 - 25.0    Anion Gap 11.1 5.0 - 15.0 mmol/L   CBC Auto Differential   Result Value Ref Range    WBC 12.03 (H) 3.40 - 10.80 10*3/mm3    RBC 3.56 (L) 3.77 - 5.28 10*6/mm3    Hemoglobin 11.0 (L) 12.0 - 15.9 g/dL    Hematocrit 32.4 (L) 34.0 - 46.6 %    MCV 91.0 79.0 - 97.0 fL    MCH 30.9 26.6 - 33.0 pg    MCHC 34.0 31.5 - 35.7 g/dL    RDW 13.5 12.3 - 15.4 %    RDW-SD 45.2 37.0 - 54.0 fl     MPV 12.4 (H) 6.0 - 12.0 fL    Platelets 176 140 - 450 10*3/mm3    Neutrophil % 90.1 (H) 42.7 - 76.0 %    Lymphocyte % 6.2 (L) 19.6 - 45.3 %    Monocyte % 2.9 (L) 5.0 - 12.0 %    Eosinophil % 0.0 (L) 0.3 - 6.2 %    Basophil % 0.1 0.0 - 1.5 %    Immature Grans % 0.7 (H) 0.0 - 0.5 %    Neutrophils, Absolute 10.84 (H) 1.70 - 7.00 10*3/mm3    Lymphocytes, Absolute 0.75 0.70 - 3.10 10*3/mm3    Monocytes, Absolute 0.35 0.10 - 0.90 10*3/mm3    Eosinophils, Absolute 0.00 0.00 - 0.40 10*3/mm3    Basophils, Absolute 0.01 0.00 - 0.20 10*3/mm3    Immature Grans, Absolute 0.08 (H) 0.00 - 0.05 10*3/mm3    nRBC 0.0 0.0 - 0.2 /100 WBC   Blood Gas, Arterial   Result Value Ref Range    Site Arterial: right brachial     Amilcar's Test N/A     pH, Arterial 7.502 (H) 7.350 - 7.450 pH units    pCO2, Arterial 28.8 (L) 35.0 - 45.0 mm Hg    pO2, Arterial 95.6 80.0 - 100.0 mm Hg    HCO3, Arterial 22.5 22.0 - 28.0 mmol/L    Base Excess, Arterial 0.1 0.0 - 2.0 mmol/L    O2 Saturation Calculated 98.2 92.0 - 99.0 %    A-a Gradiant 0.5 mmHg    Barometric Pressure for Blood Gas 751.7 mmHg    Modality Adult Vent     FIO2 30 %    Ventilator Mode AC     Set Tidal Volume 500     Set [facility] Resp Rate 22     Rate 22 Breaths/minute    PEEP 7.5    Phosphorus   Result Value Ref Range    Phosphorus 1.1 (C) 2.5 - 4.5 mg/dL   Magnesium   Result Value Ref Range    Magnesium 2.4 (H) 1.7 - 2.3 mg/dL   Blood Gas, Arterial   Result Value Ref Range    Site Arterial: right brachial     Amilcar's Test N/A     pH, Arterial 7.413 7.350 - 7.450 pH units    pCO2, Arterial 36.4 35.0 - 45.0 mm Hg    pO2, Arterial 107.4 (H) 80.0 - 100.0 mm Hg    HCO3, Arterial 23.2 22.0 - 28.0 mmol/L    Base Excess, Arterial -1.1 (L) 0.0 - 2.0 mmol/L    O2 Saturation Calculated 98.3 92.0 - 99.0 %    A-a Gradiant 0.6 mmHg    Barometric Pressure for Blood Gas 751.5 mmHg    Modality Adult Vent     FIO2 30 %    Ventilator Mode AC     Set Tidal Volume 500     Set Mech Resp Rate 22     Rate 14  Breaths/minute    PEEP 7.5    POC Glucose Once   Result Value Ref Range    Glucose 184 (H) 70 - 130 mg/dL   POC Glucose Once   Result Value Ref Range    Glucose 162 (H) 70 - 130 mg/dL   POC Glucose Once   Result Value Ref Range    Glucose 165 (H) 70 - 130 mg/dL   POC Glucose Once   Result Value Ref Range    Glucose 325 (H) 70 - 130 mg/dL   POC Glucose Once   Result Value Ref Range    Glucose 319 (H) 70 - 130 mg/dL   POC Glucose Once   Result Value Ref Range    Glucose 299 (H) 70 - 130 mg/dL   POC Glucose Once   Result Value Ref Range    Glucose 215 (H) 70 - 130 mg/dL   POC Glucose Once   Result Value Ref Range    Glucose 190 (H) 70 - 130 mg/dL   POC Glucose Once   Result Value Ref Range    Glucose 226 (H) 70 - 130 mg/dL   Light Blue Top   Result Value Ref Range    Extra Tube hold for add-on    Green Top (Gel)   Result Value Ref Range    Extra Tube Hold for add-ons.    Lavender Top   Result Value Ref Range    Extra Tube hold for add-on    Gold Top - SST   Result Value Ref Range    Extra Tube Hold for add-ons.      Imaging Results (Last 72 Hours)     Procedure Component Value Units Date/Time    XR Abdomen KUB [[id number]] Collected:  08/08/20 2317     Updated:  08/08/20 2322    Narrative:       KUB     HISTORY: Feeding tube placement     COMPARISON: None available.     FINDINGS:  Weighted enteric feeding tube appears to be coiled upon itself within  the fundus of the stomach. Bilateral pulmonary opacities are seen.  Extensive gaseous distention of loops of bowel is seen throughout the  abdomen. Again, there is a lucency which is abutting the right  hemidiaphragm. This is favored to be air within the hepatic flexure of  the colon, but given the degree of distention, further evaluation with  CT of the abdomen and pelvis is recommended. Extensive bilateral  pulmonary opacities are again seen.       Impression:          1. Weighted enteric feeding tube appears to be coiled within the  stomach.  2. Marked gaseous distention  of bowel seen throughout the abdomen.  Further evaluation with CT is recommended.     This report was finalized on 8/8/2020 11:19 PM by Dr. Pooja Gee M.D.       CT Head Without Contrast [[id number]] Collected:  08/08/20 2057     Updated:  08/08/20 2103    Narrative:       CT OF THE HEAD WITHOUT CONTRAST     HISTORY: Altered mental status     COMPARISON: 11/08/2017     TECHNIQUE: Axial CT imaging was obtained through the brain. No IV  contrast was administered.     FINDINGS:  No acute intracranial hemorrhage is seen. There is diffuse atrophy.  There is periventricular and deep white matter microangiopathic change.  There is no midline shift or mass effect. Mild mucosal thickening is  seen within the ethmoid sinuses. There is mild partial opacification of  the mastoid air cells. Similar findings were present on prior study.       Impression:       No acute findings.     Radiation dose reduction techniques were utilized, including automated  exposure control and exposure modulation based on body size.     This report was finalized on 8/8/2020 9:00 PM by Dr. Pooja Gee M.D.       XR Chest 1 View [[id number]] Collected:  08/08/20 2012     Updated:  08/08/20 2020    Narrative:       PORTABLE CHEST RADIOGRAPH     HISTORY: Tube placement     COMPARISON: 08/08/2020     FINDINGS:  Endotracheal tube terminates in satisfactory position. Cardiac  silhouette is stable. Bilateral pulmonary opacities are seen. Appearance  is concerning for multifocal pneumonia, it would be in keeping with  patient's diagnosis of COVID 19. Gaseous distention of loops of bowel is  noted within the upper abdomen. Lucent area within the right upper  quadrant is suspected to be related to air within the hepatic flexure of  the colon, but given the presence of gaseous distention of bowel, it  could be further evaluated with CT of the abdomen and pelvis. No  pneumothorax is seen. Small left pleural effusion is suspected.       Impression:           1. Endotracheal tube terminates in satisfactory position.  2. Multifocal pulmonary opacities, in keeping with diagnosis of COVID  19.  3. Lucency seen within the right upper quadrant is suspected to  represent air within the hepatic flexure of the colon. There is gaseous  distention of loops of bowel seen within the upper abdomen, and CT could  be considered for additional evaluation.     This report was finalized on 8/8/2020 8:17 PM by Dr. Pooja Gee M.D.       XR Chest 1 View [[id number]] Collected:  08/08/20 1528     Updated:  08/08/20 1630    Narrative:       ONE VIEW PORTABLE CHEST     HISTORY: Shortness of breath.     There is cardiomegaly with considerable interstitial prominence that is  new since a study of 07/05/2018 and likely relates to changes of  congestive heart failure. I cannot completely exclude the possibility of  interstitial pneumonitis and clinical correlation is recommended.     This report was finalized on 8/8/2020 4:26 PM by Dr. Marty Daniels M.D.                 aspirin 81 mg Nasogastric Daily   bacitracin  Topical Q12H   carvedilol 25 mg Oral BID   dexamethasone 6 mg Intravenous Q6H   docusate sodium 100 mg Nasogastric BID   enoxaparin 1 mg/kg Subcutaneous Q24H   famotidine 20 mg Nasogastric Daily   insulin glargine 15 Units Subcutaneous Q12H   insulin lispro 0-9 Units Subcutaneous Q6H   levothyroxine 88 mcg Oral Q AM   nystatin  Topical TID   sodium chloride 10 mL Intravenous Q12H   sodium phosphate IVPB 20 mmol Intravenous Once       dextrose 125 mL/hr    propofol 5-50 mcg/kg/min Last Rate: Stopped (08/10/20 1039)       Assessment/Plan   1.  Acute kidney injury associated with volume depletion and hypernatremia  2.  Acute hypoxemic respiratory failure currently on the ventilator  3.  COVID-19 pneumonia  4.  Encephalopathy  5.  Hypokalemia and hypophosphatemia        Plan:  1.  Check random urine for sodium, and chloride also protein to creatinine ratio  2.  Replete  potassium by using potassium replacement protocol  3.  Replete phosphorus by giving patient 20 mmol of sodium phosphate IV over 6 hours x 1 today and recheck and adjust treatment as needed  4.  Change IV fluid to D5W at 125 cc/h recheck labs tomorrow and make more adjustment with the fluid.  5.  Surveillance labs    Thank you Dr. Godoy for asking me to see this patient in consultation        I discussed the patient's findings and my recommendations with the patient's nurse    Sung Woodward MD  08/10/20  12:18      Much of this encounter note is an electronic transcription/translation of spoken language to printed text. The electronic translation of spoken language may permit erroneous, or at times, nonsensical words or phrases to be inadvertently transcribed; Although I have reviewed the note for such errors, some may still exist

## 2020-08-10 NOTE — PROGRESS NOTES
INFECTIOUS DISEASES PROGRESS NOTE    CC: Follow-up COVID-19    S:   Records obtained from [facility].  It appears that she was admitted to that facility but did not require any supplemental oxygen and was discharged to rehab.    O:  Physical Exam:  Temp:  [97.2 °F (36.2 °C)-98.2 °F (36.8 °C)] 98.1 °F (36.7 °C)  Heart Rate:  [47-67] 47  Resp:  [12-24] 12  BP: ()/(41-77) 86/52  FiO2 (%):  [30 %] 30 %  Physical Exam   Constitutional: No distress. She is sedated and intubated.   Pulmonary/Chest: Effort normal. She is intubated. She has rales.   Abdominal: Soft. She exhibits no distension. There is no tenderness.   Skin: Skin is warm and dry.        Diagnostics:    Sodium 153  Potassium 2.7  Creatinine 1.66    Strep/legionella neg  Resp cx Nl uday      Assessment/Plan   1.  Acute hypoxic respiratory failure  2.  Acute encephalopathy  3.  Hypernatremia  4.  COVID-19 infection    She really is not terribly hypoxic.  She is on 30% FiO2 and this can probably be weaned further.  Discussed with nursing and appears to be some that she cannot be extubated is because she does not have sufficient respiratory drive and has very low underlying respiratory rate.  I really do not think she has indications for experimental therapies.  Not much to add at this time. We will be happy to reevaluate should infectious concerns evolve.    Bijan Shafer MD  10:58 AM  08/10/20

## 2020-08-10 NOTE — NURSING NOTE
CWOCN- patient has a pressure injury- DTI-present on admission to the right heel. Recommend to off load and cover with a silicone border dressing for padding. No topical therapy needed as it will make the skin moist.

## 2020-08-10 NOTE — PROGRESS NOTES
Adult Nutrition  Assessment/PES    Patient Name:  Irina Brown  YOB: 1928  MRN: [id number]  Admit Date:  8/8/2020    Assessment Date:  8/10/2020    Comments:  TF follow up. Pt on Diabetisource AC at 45ml/hr and 50cc q2hr water flush. Propofol on hold at this time. Last BM 8/10. If able to keep propofol off, recTF goal rate at 60ml/hr. Discussed care in rounds. Will continue to follow.    Reason for Assessment     Row Name 08/10/20 1258          Reason for Assessment    Reason For Assessment  follow-up protocol     Diagnosis  -- on the vent             Labs/Tests/Procedures/Meds     Row Name 08/10/20 1258          Labs/Procedures/Meds    Lab Results Reviewed  reviewed     Lab Results Comments  na, k, glu, bun, cr, phos, mg        Diagnostic Tests/Procedures    Diagnostic Test/Procedure Reviewed  reviewed        Medications    Pertinent Medications Reviewed  reviewed     Pertinent Medications Comments  decadron, colace lovenox, pepcid, insulin, syntroid, naphos, IVF's 125         Physical Findings     Row Name 08/10/20 1300          Physical Findings    Overall Physical Appearance  on ventilator support     Gastrointestinal  feeding tube     Tubes  nasogastric tube     Skin  other (see comments);pressure injury R heel pressure injury           Nutrition Prescription Ordered     Row Name 08/10/20 1300          Nutrition Prescription PO    Current PO Diet  NPO        Nutrition Prescription EN    Enteral Route  NG     Product  Diabetisource AC (Glucerna 1.2)     TF Delivery Method  Continuous     Continuous TF Goal Rate (mL/hr)  45 mL/hr     Continuous TF Current Rate (mL/hr)  45 mL/hr     Water flush (mL)   50 mL     Water Flush Frequency  Every 2 hours         Evaluation of Received Nutrient/Fluid Intake     Row Name 08/10/20 1301          Calories Evaluation    Enteral Calories (kcal)  1296     % of Kcal Needs  76        Protein Evaluation    Enteral Protein (gm)  65     % of Protein Needs  97         Intake Assessment    Energy/Calorie Requirement Assessment  not meeting needs     Protein Requirement Assessment  meeting needs        Fluid Intake Evaluation    Enteral (Free Water) Fluid (mL)  855     Free Water Flush Fluid (mL)  600     Total Free Water Intake (mL)  1455 mL        EN Evaluation    HOB  Greater than or equal to 30 degress        PN Evaluation    PN Average delivery (mL/day)   0 mL/day on hold this am               Problem/Interventions:          Intervention Goal     Row Name 08/10/20 1303          Intervention Goal    General  Maintain nutrition;Nutrition support treatment;Improved nutrition related lab(s);Reduce/improve symptoms;Meet nutritional needs for age/condition;Disease management/therapy     TF/PN  Tolerate TF at goal     Weight  No significant weight loss         Nutrition Intervention     Row Name 08/10/20 1304          Nutrition Intervention    RD/Tech Action  Follow Tx progress;Care plan reviewd         Nutrition Prescription     Row Name 08/10/20 1303          Nutrition Prescription EN    Enteral Prescription  Enteral begin/change     Product  Diabetisource     TF Delivery Method  Continuous     Continuous TF Goal Rate (mL/hr)  60 mL/hr if able to keep propofol off         Education/Evaluation     Row Name 08/10/20 130          Monitor/Evaluation    Monitor  Per protocol;I&O;Pertinent labs;TF delivery/tolerance;Weight;Skin status           Electronically signed by:  Alexandra Flowers RD  08/10/20 13:20

## 2020-08-10 NOTE — PLAN OF CARE
Patient remains in CCU on the vent, sedation weaned off and patient tolerating SBT, will attempt to extubate tomorrow if continues to remain stable. Poor urine output this shift, 1 large void. Nephrology consulted and aware. Electrolytes being replaced per protocol. Family updated via telephone,

## 2020-08-10 NOTE — PROGRESS NOTES
[facility]    Mar/chart reviewed  Follow up acute hypoxemic respiratory failure, COVID19 pneumonia  Patient intubated and sedated unable to provide subjective  Sedation held most of the day yesterday, she failed SBT low tv and decreased rate    Vital Sign Min/Max for last 24 hours  Temp  Min: 97.2 °F (36.2 °C)  Max: 98.2 °F (36.8 °C)   BP  Min: 86/41  Max: 150/62   Pulse  Min: 47  Max: 71   Resp  Min: 22  Max: 24   SpO2  Min: 95 %  Max: 100 %   No data recorded   Weight  Min: 57.9 kg (127 lb 10.3 oz)  Max: 57.9 kg (127 lb 10.3 oz)   2482/125  Appears ill, sedated on vent  perrl, normal sclera, no palpable thyroid nodules  mmm, no jvd, trachea midline, neck supple,  chest fair ae bilaterally, + crackles, no wheezes,   rrr,   soft, nt, nd +bs,  no c/c/ e  Skin warm, dry no rashes, decreased turgor    Labs: 8/10: reviewed:  7.50/28/95 8/9: d dimer 2.42    A/P:  1. Acute hypoxemic and hypercapnic respiratory failure -- she is actually currently on minimal oxygen support may start spontaneous trials    2. COVID19 pneumonia -- given she was diagnosed on 7/29 I have to consider bacterial supra infection or PE as possible contributions to her recent decline.  currently on emperic anticoagulation, will see what her Cr is today, possible take for ct pe  3. Dehydration with hypernatremia -- improved getting tube feeding  4. DMII with hyperglycemia-- accuchecks are high, she is on d5 and steroids, on lantus. D/c d5 today  5. Ileus with dilated large bowel, history of  Multiple abodminal surgeries -- monitor stooling and abdominal exam  6. PVD  7. Dementia  8. Breast cancer in remission    Decrease rate on vent, minimize sedatives and narcotics.  Possible ct imaging depending on cr.    CC 35 mins.

## 2020-08-10 NOTE — NURSING NOTE
Updated Maria Fernanda patients POA of patient status this shift. Maria Fernanda is agreeable to discuss care goals with palliative care tomorrow. Order placed per protocol.

## 2020-08-11 NOTE — NURSING NOTE
Orders for palliative/comfort care only placed. Called [facility] at 0500 and spoke to [facility] representative Mp, pt cleared by [facility] to extubate. Pt extubated at 0540 per RT to 4L N/C for comfort.

## 2020-08-11 NOTE — CONSULTS
Patient was extubated this morning. As she is covid positive, the family is not coming in to see her per her nurse. She is resting and appears comfortable, can transfer if the bed is needed. Report to our charge nurse Starla to hold a bed for her.

## 2020-08-11 NOTE — NURSING NOTE
"Overall decline in patient status overnight. Vent had been set to spontaneous mode since yesterday afternoon, and patient had been breathing about 12-14 times a minute spontaneously. Around 0300, pt went apneic and ventilator took over at a rate of 20, pt was not overbreathing the vent at this time. Around the same time, pt's HR began to luana down into low 40s, pt's rate previously had been 50s-60s. Pt was minimally responsive, took vigorous effort to awaken her. Pt was taken off Propofol yesterday morning and has remained off of it since then, she was only given a Percocet around 2300 last night. RT called into room and we flipped the vent to spontaneous again to see if patient was taking any independent breaths, no spontaneous breaths were observed at that time and vent was put back at a rate and volume control to support patient.     Prior to this, pt had been anxious with any stimulation, muscles tense and stiff when turned or assessed. Pain medication had to be given with discretion as not to depress pt's respiratory drive too much, and her BP would drop some with pain med administration.     I called and spoke with pt's SHANNON and donna Maria Fernanda Osborn and told her of tonight's events. There were already plans for a palliative consult today. I explained that despite my efforts to try to make patient comfortable, she seemed miserable on the ventilator and with the current aggressive treatment. Maria Fernanda stated that she was hoping patient would get stronger and overcome this illness, but said she did not want patient to suffer. Maria Fernanda asked \"if this was your grandmother or auntie would you decide to make her comfortable?\" I told her yes. I explained what comfort measures we could provide patient under palliative/comfort measures order and that our goal is to provide patient with dignity and comfort. Multiple times, I offered her time to think about what decision she would like to make. She stated she'd like to go ahead " and proceed with palliative care/comfort measures only. I called Dr. Torres and discussed case with him, he stated if it's the family's wishes to make her comfort care, then it is ok to proceed with placing those orders.

## 2020-08-11 NOTE — PROGRESS NOTES
[facility]     Mar/chart reviewed  Follow up acute hypoxemic respiratory failure, COVID19 pneumonia  Patient extubated but doesn't provide subjective  Was able to tolerate PS most all day yesterday but mental status, strenght was too poor for extubation  Family requested comfort measures only    Vital Sign Min/Max for last 24 hours  No data recorded   BP  Min: 86/52  Max: 163/71   Pulse  Min: 42  Max: 66   Resp  Min: 12  Max: 20   SpO2  Min: 96 %  Max: 100 %   Flow (L/min)  Min: 4  Max: 4   No data recorded   1193/5    Appears ill, eyes open not alert  perrl, normal sclera, no palpable thyroid nodules  mmm, no jvd, trachea midline, neck supple,  Chest decreased ae bilaterally, + crackles, no wheezes,   rrr,   soft, nt, nd +bs,  no c/c/ e  Skin warm, dry no rashes, decreased turgor    Labs: 8/11: reviewed:  Pending    A/P:  1. Acute hypoxemic and hypercapnic respiratory failure -- tolerated PS well yesterday. I'm still worried about her overall cough and mental status --now comfort measures only  2. COVID19 pneumonia -- comfort measures  3. Dehydration with hypernatremia -- improved getting tube feeding --change to comfort measures  4. DMII with hyperglycemia-- accuchecks are high, she is on d5 and steroids, on lantus.   5. Ileus with dilated large bowel, history of  Multiple abodminal surgeries -- monitor stooling and abdominal exam  6. PVD  7. Dementia  8. Breast cancer in remission  9. Hypernatremia --on d5. Renal following  10. roc on ckd -- renal following    Comfort measures. Transfer to [facility].

## 2020-08-11 NOTE — PLAN OF CARE
Vitals the same. Pt medicated prior to turns with 2mg of morphine. Pt does best on sides due to congestion. S/w patient's POA, she is ok with patient being medicated prior to turns.

## 2020-08-11 NOTE — PROGRESS NOTES
"   LOS: 3 days    Patient Care Team:  Alexandra Baptiste MD as PCP - General  Alexandra Baptiste MD as PCP - [facility]  MayraBrittni cardona as PCP - Claims Attributed    Chief Complaint:    Chief Complaint   Patient presents with   • Shortness of Breath     Follow UP JERI  Subjective     Interval History:   DW RN Yuli.  Patient status declined overnight. Became apneic on vent,  bradycardic to 40's, less responsive.  POA niece contacted and decided on palliative care.  Patient extubated. Now on NC.  No sedation since yesterday am.     Review of Systems:   Unable to obtain    Objective     Vital Signs  Heart Rate:  [42-66] 42  Resp:  [12-20] 20  BP: ()/(41-76) 143/64  FiO2 (%):  [30 %] 30 %    Flowsheet Rows      First Filed Value   Admission Height  175.3 cm (69.02\") Documented at 08/09/2020 1049   Admission Weight  57.7 kg (127 lb 4.8 oz) Documented at 08/08/2020 1715          No intake/output data recorded.  I/O last 3 completed shifts:  In: 2379 [I.V.:1218; Other:626; NG/GT:399; IV Piggyback:136]  Out: 55 [Urine:55]    Intake/Output Summary (Last 24 hours) at 8/11/2020 0731  Last data filed at 8/10/2020 2303  Gross per 24 hour   Intake 1193 ml   Output 5 ml   Net 1188 ml       Physical Exam:  I did not enter the room.  BP 91/51. Pulse 76  Viewed through window. Patient sleeping, comfortable.  Nasal cannula  Cortrak  Brittany       Results Review:    Results from last 7 days   Lab Units 08/10/20  2113 08/10/20  0747 08/09/20  0254 08/08/20  2239 08/08/20  1557   SODIUM mmol/L  --  153* 158* 159* 163*   POTASSIUM mmol/L 2.6* 2.7* 3.7 4.2 4.0   CHLORIDE mmol/L  --  121* 123* 122* 123*   CO2 mmol/L  --  20.9* 23.4 21.4* 26.0   BUN mg/dL  --  73* 51* 47* 45*   CREATININE mg/dL  --  1.66* 1.26* 1.19* 0.99   CALCIUM mg/dL  --  8.8 8.5 8.5 9.6   BILIRUBIN mg/dL  --   --   --   --  0.3   ALK PHOS U/L  --   --   --   --  54   ALT (SGPT) U/L  --   --   --   --  18   AST (SGOT) U/L  --   --   --   --  16   GLUCOSE mg/dL  -- "  203* 233* 209* 169*       Estimated Creatinine Clearance: 20.2 mL/min (A) (by C-G formula based on SCr of 1.66 mg/dL (H)).    Results from last 7 days   Lab Units 08/10/20  2113 08/10/20  0747   MAGNESIUM mg/dL 1.4* 2.4*   PHOSPHORUS mg/dL  --  1.1*             Results from last 7 days   Lab Units 08/10/20  0747 08/09/20  0254 08/08/20  2240 08/08/20  1557   WBC 10*3/mm3 12.03* 7.86 9.90 9.17   HEMOGLOBIN g/dL 11.0* 12.2 11.8* 14.2   PLATELETS 10*3/mm3 176 162 155 195       Results from last 7 days   Lab Units 08/08/20  1557   INR  1.18*         Imaging Results (Last 24 Hours)     ** No results found for the last 24 hours. **          bacitracin  Topical Q12H   dexamethasone 6 mg Intravenous Q6H   insulin lispro 0-9 Units Subcutaneous Q6H   levothyroxine 88 mcg Oral Q AM   nystatin  Topical TID   sodium chloride 10 mL Intravenous Q12H       dextrose 125 mL/hr Last Rate: Stopped (08/11/20 0600)   propofol 5-50 mcg/kg/min Last Rate: Stopped (08/10/20 1039)       Medication Review:   Current Facility-Administered Medications   Medication Dose Route Frequency Provider Last Rate Last Dose   • acetaminophen (TYLENOL) tablet 650 mg  650 mg Oral Q4H PRN Pawan Torres MD        Or   • acetaminophen (TYLENOL) 160 MG/5ML solution 650 mg  650 mg Oral Q4H PRPawan Hickman MD        Or   • acetaminophen (TYLENOL) suppository 650 mg  650 mg Rectal Q4H PRPawan Hickman MD       • acetaminophen (TYLENOL) tablet 650 mg  650 mg Oral Q4H PRPawan Hickman MD        Or   • acetaminophen (TYLENOL) 160 MG/5ML solution 650 mg  650 mg Oral Q4H PRPawan Hickman MD        Or   • acetaminophen (TYLENOL) suppository 650 mg  650 mg Rectal Q4H PRPawan Hickman MD       • acetaminophen (TYLENOL) tablet 650 mg  650 mg Oral Q4H PRPawan Hickman MD        Or   • acetaminophen (TYLENOL) suppository 650 mg  650 mg Rectal Q4H PRN Pawan Torres MD       • bacitracin 500 UNIT/GM ointment   Topical Q12H Pawan Torres MD       •  bisacodyl (DULCOLAX) suppository 10 mg  10 mg Rectal Daily PRN Pawan Torres MD       • dexamethasone (DECADRON) injection 6 mg  6 mg Intravenous Q6H Pawan Torres MD   6 mg at 08/11/20 0334   • dextrose (D50W) 25 g/ 50mL Intravenous Solution 25 g  25 g Intravenous Q15 Min PRN Pawan Torres MD       • dextrose (D5W) 5 % infusion  125 mL/hr Intravenous Continuous TraceSung MD   Stopped at 08/11/20 0600   • dextrose (GLUTOSE) oral gel 15 g  15 g Oral Q15 Min PRN Pawan Torres MD       • diphenoxylate-atropine (LOMOTIL) 2.5-0.025 MG per tablet 1 tablet  1 tablet Oral Q2H PRN Pawan Torres MD       • fentaNYL citrate (PF) (SUBLIMAZE) injection 25 mcg  25 mcg Intravenous Q1H PRN Pawan Torres MD   25 mcg at 08/08/20 2150   • glucagon (human recombinant) (GLUCAGEN DIAGNOSTIC) injection 1 mg  1 mg Subcutaneous Q15 Min PRN Pawan Torres MD       • Glycerin-Hypromellose- (ARTIFICIAL TEARS) 0.2-0.2-1 % ophthalmic solution solution 1 drop  1 drop Both Eyes Q30 Min PRN Pawan Torres MD       • glycopyrrolate PF (ROBINUL) injection 0.2 mg  0.2 mg Intravenous Q2H PRN Pawan Torres MD   0.2 mg at 08/11/20 0634    Or   • glycopyrrolate PF (ROBINUL) injection 0.2 mg  0.2 mg Subcutaneous Q2H PRN Pawan Torres MD        Or   • glycopyrrolate PF (ROBINUL) injection 0.4 mg  0.4 mg Intravenous Q2H PRN Pawan Torres MD        Or   • glycopyrrolate PF (ROBINUL) injection 0.4 mg  0.4 mg Subcutaneous Q2H PRN Pawan Torres MD       • hydrALAZINE (APRESOLINE) injection 10 mg  10 mg Intravenous Q4H PRN Pawan Torres MD       • insulin lispro (humaLOG) injection 0-9 Units  0-9 Units Subcutaneous Q6H Pawan Torres MD   2 Units at 08/11/20 0001   • levothyroxine (SYNTHROID, LEVOTHROID) tablet 88 mcg  88 mcg Oral Q AM Pawan Torres MD   88 mcg at 08/10/20 0602   • Magnesium Sulfate 2 gram Bolus, followed by 8 gram infusion (total Mg dose 10 grams)- Mg less than or equal to 1mg/dL  2 g Intravenous PRN  Pawan Torres MD        Or   • Magnesium Sulfate 2 gram / 50mL Infusion (GIVE X 3 BAGS TO EQUAL 6GM TOTAL DOSE) - Mg 1.1 - 1.5 mg/dl  2 g Intravenous PRN Pawan Torres MD 25 mL/hr at 08/11/20 0314 2 g at 08/11/20 0314    Or   • Magnesium Sulfate 4 gram infusion- Mg 1.6-1.9 mg/dL  4 g Intravenous PRN Pawan Torres MD       • morphine injection 2 mg  2 mg Intravenous Q1H PRN Pawan Torres MD   2 mg at 08/11/20 0540   • morphine injection 4 mg  4 mg Intravenous Q1H PRN Pawan Torres MD       • naloxone (NARCAN) injection 0.1 mg  0.1 mg Intravenous Q5 Min PRN Pawan Torres MD       • nystatin (MYCOSTATIN) powder   Topical TID Pawan Torres MD       • ondansetron (ZOFRAN) tablet 4 mg  4 mg Oral Q6H PRN Pawan Torres MD        Or   • ondansetron (ZOFRAN) injection 4 mg  4 mg Intravenous Q6H PRN Pawan Torres MD       • oxyCODONE-acetaminophen (PERCOCET) 5-325 MG per tablet 1 tablet  1 tablet Oral Q4H PRN Gerardo Godoy MD   1 tablet at 08/10/20 2303   • propofol (DIPRIVAN) infusion 10 mg/mL 100 mL  5-50 mcg/kg/min Intravenous Titrated Pawan Torres MD   Stopped at 08/10/20 1039   • sodium chloride 0.9 % flush 10 mL  10 mL Intravenous PRN Pawan Torres MD       • sodium chloride 0.9 % flush 10 mL  10 mL Intravenous Q12H Pawan Torres MD   10 mL at 08/10/20 2050   • sodium chloride 0.9 % flush 10 mL  10 mL Intravenous PRN Pawan Torres MD           Assessment/Plan      1.  Acute kidney injury associated with volume depletion and hypernatremia.  Anuric.   2.  Acute hypoxemic respiratory failure . Extubated with palliative care measures.   3.  COVID-19 pneumonia  4.  Encephalopathy  5.  Hypokalemia and hypophosphatemia. Replaced yesterday.     Palliative care measures.  Will sign off. Please call with questions .      Liudmila Connell MD  08/11/20  07:31

## 2020-08-12 NOTE — PROGRESS NOTES
Case Management Discharge Note      Final Note:  2020       Final Discharge Disposition Code: 20 -        Gaudencio Martins RN

## 2020-08-22 NOTE — DISCHARGE SUMMARY
[facility]    Admit date: 8/8/2020  Discharge date: 8/11/2020    Admission/discharge diagnosis:  1. Acute hypoxemic and hypercapnic respiratory failure --   2. COVID19 pneumonia --   3. Dehydration with hypernatremia --   4. DMII with hyperglycemia-- a  5. Ileus with dilated large bowel, history of  Multiple abodminal surgeries --   6. PVD  7. Dementia  8. Breast cancer in remission  9. Hypernatremia --  10. roc on ckd     HPI: reviewed as per Dr. Torres:  Irina Brown is a 89 y.o. female with limited history given the fact that she is currently intubated on the ventilator, but she was brought in because of altered mental status and was found to be hypoxemic.  Patient is already known positive case of COVID-19 pneumonia at her nursing facility, she has history of diabetes with diabetic neuropathy, history of uterine cancer, hypertension, left bundle branch block and peripheral vascular disease with extensive abdominal surgery based on the scars in her abdomen probably from her uterine cancer prior surgery.  History of breast cancer that is currently in remission for years.  Based on the conversation with Dr. Almonte, the patient was a full code per POA, she was hypoxic on high flow nasal cannula oxygen and ended up getting intubated.  Currently she is on the ventilator and we were able to wean her down to 40% FiO2 on a PEEP of 7.5 with sustained oxygenation in the high 90s.  ABG showed some respiratory acidosis and the vent setting were adjusted further.  History is extremely limited because of the fact that she came from the nursing facility rather than home and patient is unable to provide me with any history and the history provided to the emergency room physician was also limited.  I was able to get the information based on her records in the system.  After calling the power of   Mrs. Maria Fernanda Osborn and discussing the case, she was able to give me some more information, the patient lived in a  Alzheimer's unit and she was functional able to walk and was admitted at [facility] on 7/29/2020 with pneumonia that was labeled as COVID-19 pneumonia, was treated and discharged on that 6 to a rehab unit and she did develop the confusion later on and that is when she was brought into [facility] emergency room for further care.    Hospital course:  Mrs. Brown did well with oxygenation but she remained very weak and overall failure to thrive.  Family requested comfort measures.

## 2022-12-21 NOTE — DISCHARGE PLACEMENT REQUEST
"Olga Peter (89 y.o. Female)     Date of Birth Social Security Number Address Home Phone MRN    10/04/1928  [address]  [address] [facility] [place] [address] [phone] [id number]    Voodoo Marital Status          Gnosticist        Admission Date Admission Type Admitting Provider Attending Provider Department, Room/Bed    8/8/20 Emergency Pawan Torres MD Saad, Lebnan S, MD [facility] CORONARY [facility], [address]1    Discharge Date Discharge Disposition Discharge Destination                       Attending Provider:  Pawan Torres MD    Allergies:  Ciprofloxacin, Diazepam, Hydrocodone, Hydrocodone-acetaminophen, Lidocaine, Penicillins, Statins    Isolation:  Enh Drop/Con   Infection:  COVID (confirmed) (08/09/20)   Code Status:  No CPR    Ht:  175.3 cm (69.02\")   Wt:  57.9 kg (127 lb 10.3 oz)    Admission Cmt:  None   Principal Problem:  None                Active Insurance as of 8/8/2020     Primary Coverage     Payor Plan Insurance Group Employer/Plan Group    MEDICARE MEDICARE A & B      Payor Plan Address Payor Plan Phone Number Payor Plan Fax Number Effective Dates    [address] [phone]  10/1/1993 - None Entered    [facility] [id number]       Subscriber Name Subscriber Birth Date Member ID       OLGA PETER 10/4/1928 [id number]           Secondary Coverage     Payor Plan Insurance Group Employer/Plan Group    [place] [id number]     Payor Plan Address Payor Plan Phone Number Payor Plan Fax Number Effective Dates    [address]   9/9/2008 - None Entered    [facility] [id number]       Subscriber Name Subscriber Birth Date Member ID       OLGA PETER 10/4/1928 [id number]                 Emergency Contacts      (Rel.) Home Phone Work Phone Mobile Phone    Maria Fernanda Osborn (Power of ) -- -- [phone]    HendersonHal mas (Son) [phone] -- [phone]              "
None